# Patient Record
Sex: MALE | Race: WHITE | NOT HISPANIC OR LATINO | URBAN - METROPOLITAN AREA
[De-identification: names, ages, dates, MRNs, and addresses within clinical notes are randomized per-mention and may not be internally consistent; named-entity substitution may affect disease eponyms.]

---

## 2020-07-02 ENCOUNTER — AMBULATORY - HEALTHEAST (OUTPATIENT)
Dept: GERIATRICS | Facility: CLINIC | Age: 75
End: 2020-07-02

## 2020-07-03 ENCOUNTER — AMBULATORY - HEALTHEAST (OUTPATIENT)
Dept: GERIATRICS | Facility: CLINIC | Age: 75
End: 2020-07-03

## 2020-07-03 ENCOUNTER — COMMUNICATION - HEALTHEAST (OUTPATIENT)
Dept: GERIATRICS | Facility: CLINIC | Age: 75
End: 2020-07-03

## 2020-07-03 ENCOUNTER — OFFICE VISIT - HEALTHEAST (OUTPATIENT)
Dept: GERIATRICS | Facility: CLINIC | Age: 75
End: 2020-07-03

## 2020-07-03 ENCOUNTER — AMBULATORY - HEALTHEAST (OUTPATIENT)
Dept: ADMINISTRATIVE | Facility: CLINIC | Age: 75
End: 2020-07-03

## 2020-07-03 DIAGNOSIS — R10.30 LOWER ABDOMINAL PAIN: ICD-10-CM

## 2020-07-03 DIAGNOSIS — K22.2 ESOPHAGEAL OBSTRUCTION: ICD-10-CM

## 2020-07-03 DIAGNOSIS — R52 PAIN: ICD-10-CM

## 2020-07-03 DIAGNOSIS — Z71.89 ACP (ADVANCE CARE PLANNING): ICD-10-CM

## 2020-07-03 RX ORDER — PANTOPRAZOLE SODIUM 40 MG/1
40 TABLET, DELAYED RELEASE ORAL DAILY
Status: SHIPPED | COMMUNITY
Start: 2020-07-03

## 2020-07-03 RX ORDER — ATORVASTATIN CALCIUM 40 MG/1
40 TABLET, FILM COATED ORAL AT BEDTIME
Status: SHIPPED | COMMUNITY
Start: 2020-07-03

## 2020-07-03 RX ORDER — METRONIDAZOLE 500 MG/1
500 TABLET ORAL 3 TIMES DAILY
Status: SHIPPED | COMMUNITY
Start: 2020-07-03

## 2020-07-03 RX ORDER — ESOMEPRAZOLE MAGNESIUM 40 MG/1
40 CAPSULE, DELAYED RELEASE ORAL
Status: SHIPPED | COMMUNITY
Start: 2020-07-03

## 2020-07-03 RX ORDER — CALCIUM CARBONATE 500 MG/1
1 TABLET, CHEWABLE ORAL DAILY
Status: SHIPPED | COMMUNITY
Start: 2020-07-03

## 2020-07-03 RX ORDER — ACETAMINOPHEN 500 MG
1000 TABLET ORAL 3 TIMES DAILY
Status: SHIPPED | COMMUNITY
Start: 2020-07-03

## 2020-07-03 RX ORDER — FAMOTIDINE 40 MG/1
40 TABLET, FILM COATED ORAL DAILY
Status: SHIPPED | COMMUNITY
Start: 2020-07-03

## 2020-07-03 RX ORDER — IBUPROFEN 400 MG/1
400 TABLET, FILM COATED ORAL EVERY 6 HOURS PRN
Status: SHIPPED | COMMUNITY
Start: 2020-07-03

## 2020-07-03 RX ORDER — CASPOFUNGIN ACETATE 5 MG/ML
50 INJECTION, POWDER, LYOPHILIZED, FOR SOLUTION INTRAVENOUS AT BEDTIME
Status: SHIPPED | COMMUNITY
Start: 2020-07-03

## 2020-07-03 RX ORDER — AMOXICILLIN 250 MG
1 CAPSULE ORAL DAILY
Status: SHIPPED | COMMUNITY
Start: 2020-07-03

## 2020-07-03 RX ORDER — POLYETHYLENE GLYCOL 3350 17 G/17G
17 POWDER, FOR SOLUTION ORAL DAILY
Status: SHIPPED | COMMUNITY
Start: 2020-07-03

## 2020-07-03 RX ORDER — HYDROXYZINE PAMOATE 25 MG/1
25 CAPSULE ORAL 3 TIMES DAILY
Status: SHIPPED | COMMUNITY
Start: 2020-07-03

## 2020-07-03 RX ORDER — OXYCODONE HYDROCHLORIDE 5 MG/1
5 TABLET ORAL EVERY 4 HOURS PRN
Qty: 60 TABLET | Refills: 0 | Status: SHIPPED | OUTPATIENT
Start: 2020-07-03

## 2020-07-05 ENCOUNTER — RECORDS - HEALTHEAST (OUTPATIENT)
Dept: LAB | Facility: CLINIC | Age: 75
End: 2020-07-05

## 2020-07-06 ENCOUNTER — COMMUNICATION - HEALTHEAST (OUTPATIENT)
Dept: GERIATRICS | Facility: CLINIC | Age: 75
End: 2020-07-06

## 2020-07-06 LAB
ALT SERPL W P-5'-P-CCNC: 17 U/L (ref 0–45)
ANION GAP SERPL CALCULATED.3IONS-SCNC: 11 MMOL/L (ref 5–18)
BASOPHILS # BLD AUTO: 0 THOU/UL (ref 0–0.2)
BASOPHILS NFR BLD AUTO: 1 % (ref 0–2)
BUN SERPL-MCNC: 6 MG/DL (ref 8–28)
CALCIUM SERPL-MCNC: 8.6 MG/DL (ref 8.5–10.5)
CHLORIDE BLD-SCNC: 103 MMOL/L (ref 98–107)
CO2 SERPL-SCNC: 23 MMOL/L (ref 22–31)
CREAT SERPL-MCNC: 0.76 MG/DL (ref 0.7–1.3)
EOSINOPHIL # BLD AUTO: 0.1 THOU/UL (ref 0–0.4)
EOSINOPHIL NFR BLD AUTO: 1 % (ref 0–6)
ERYTHROCYTE [DISTWIDTH] IN BLOOD BY AUTOMATED COUNT: 15.8 % (ref 11–14.5)
GFR SERPL CREATININE-BSD FRML MDRD: >60 ML/MIN/1.73M2
GLUCOSE BLD-MCNC: 115 MG/DL (ref 70–125)
HCT VFR BLD AUTO: 40.6 % (ref 40–54)
HGB BLD-MCNC: 12.8 G/DL (ref 14–18)
LYMPHOCYTES # BLD AUTO: 0.6 THOU/UL (ref 0.8–4.4)
LYMPHOCYTES NFR BLD AUTO: 7 % (ref 20–40)
MCH RBC QN AUTO: 31 PG (ref 27–34)
MCHC RBC AUTO-ENTMCNC: 31.5 G/DL (ref 32–36)
MCV RBC AUTO: 98 FL (ref 80–100)
MONOCYTES # BLD AUTO: 0.6 THOU/UL (ref 0–0.9)
MONOCYTES NFR BLD AUTO: 8 % (ref 2–10)
NEUTROPHILS # BLD AUTO: 6.4 THOU/UL (ref 2–7.7)
NEUTROPHILS NFR BLD AUTO: 82 % (ref 50–70)
PLATELET # BLD AUTO: 362 THOU/UL (ref 140–440)
PMV BLD AUTO: 9.1 FL (ref 8.5–12.5)
POTASSIUM BLD-SCNC: 3.6 MMOL/L (ref 3.5–5)
RBC # BLD AUTO: 4.13 MILL/UL (ref 4.4–6.2)
SODIUM SERPL-SCNC: 137 MMOL/L (ref 136–145)
WBC: 7.8 THOU/UL (ref 4–11)

## 2020-07-07 ENCOUNTER — OFFICE VISIT - HEALTHEAST (OUTPATIENT)
Dept: GERIATRICS | Facility: CLINIC | Age: 75
End: 2020-07-07

## 2020-07-07 DIAGNOSIS — K22.2 ESOPHAGEAL OBSTRUCTION: ICD-10-CM

## 2020-07-07 DIAGNOSIS — C15.9 MALIGNANT NEOPLASM OF ESOPHAGUS, UNSPECIFIED LOCATION (H): ICD-10-CM

## 2020-07-07 DIAGNOSIS — R10.30 LOWER ABDOMINAL PAIN: ICD-10-CM

## 2020-07-09 ENCOUNTER — OFFICE VISIT - HEALTHEAST (OUTPATIENT)
Dept: GERIATRICS | Facility: CLINIC | Age: 75
End: 2020-07-09

## 2020-07-09 DIAGNOSIS — C15.9 MALIGNANT NEOPLASM OF ESOPHAGUS, UNSPECIFIED LOCATION (H): ICD-10-CM

## 2020-07-09 DIAGNOSIS — R13.14 PHARYNGOESOPHAGEAL DYSPHAGIA: ICD-10-CM

## 2020-07-09 DIAGNOSIS — K22.2 ESOPHAGEAL OBSTRUCTION: ICD-10-CM

## 2020-07-09 DIAGNOSIS — E44.0 MODERATE PROTEIN-CALORIE MALNUTRITION (H): ICD-10-CM

## 2020-07-09 DIAGNOSIS — R10.30 LOWER ABDOMINAL PAIN: ICD-10-CM

## 2020-07-12 ENCOUNTER — RECORDS - HEALTHEAST (OUTPATIENT)
Dept: LAB | Facility: CLINIC | Age: 75
End: 2020-07-12

## 2020-07-13 ENCOUNTER — COMMUNICATION - HEALTHEAST (OUTPATIENT)
Dept: GERIATRICS | Facility: CLINIC | Age: 75
End: 2020-07-13

## 2020-07-13 LAB
ALT SERPL W P-5'-P-CCNC: 11 U/L (ref 0–45)
BASOPHILS # BLD AUTO: 0.1 THOU/UL (ref 0–0.2)
BASOPHILS NFR BLD AUTO: 1 % (ref 0–2)
CREAT SERPL-MCNC: 0.7 MG/DL (ref 0.7–1.3)
EOSINOPHIL # BLD AUTO: 0.3 THOU/UL (ref 0–0.4)
EOSINOPHIL NFR BLD AUTO: 5 % (ref 0–6)
ERYTHROCYTE [DISTWIDTH] IN BLOOD BY AUTOMATED COUNT: 16.1 % (ref 11–14.5)
GFR SERPL CREATININE-BSD FRML MDRD: >60 ML/MIN/1.73M2
HCT VFR BLD AUTO: 40.2 % (ref 40–54)
HGB BLD-MCNC: 12.3 G/DL (ref 14–18)
LYMPHOCYTES # BLD AUTO: 1 THOU/UL (ref 0.8–4.4)
LYMPHOCYTES NFR BLD AUTO: 15 % (ref 20–40)
MCH RBC QN AUTO: 30.9 PG (ref 27–34)
MCHC RBC AUTO-ENTMCNC: 30.6 G/DL (ref 32–36)
MCV RBC AUTO: 101 FL (ref 80–100)
MONOCYTES # BLD AUTO: 0.7 THOU/UL (ref 0–0.9)
MONOCYTES NFR BLD AUTO: 11 % (ref 2–10)
NEUTROPHILS # BLD AUTO: 4.3 THOU/UL (ref 2–7.7)
NEUTROPHILS NFR BLD AUTO: 68 % (ref 50–70)
PLATELET # BLD AUTO: 341 THOU/UL (ref 140–440)
PMV BLD AUTO: 10 FL (ref 8.5–12.5)
RBC # BLD AUTO: 3.98 MILL/UL (ref 4.4–6.2)
WBC: 6.4 THOU/UL (ref 4–11)

## 2020-07-14 ENCOUNTER — OFFICE VISIT - HEALTHEAST (OUTPATIENT)
Dept: GERIATRICS | Facility: CLINIC | Age: 75
End: 2020-07-14

## 2020-07-14 DIAGNOSIS — K22.2 ESOPHAGEAL OBSTRUCTION: ICD-10-CM

## 2020-07-14 DIAGNOSIS — R10.30 LOWER ABDOMINAL PAIN: ICD-10-CM

## 2020-07-14 DIAGNOSIS — E44.0 MODERATE PROTEIN-CALORIE MALNUTRITION (H): ICD-10-CM

## 2020-07-14 DIAGNOSIS — C15.9 MALIGNANT NEOPLASM OF ESOPHAGUS, UNSPECIFIED LOCATION (H): ICD-10-CM

## 2020-07-16 ENCOUNTER — OFFICE VISIT - HEALTHEAST (OUTPATIENT)
Dept: GERIATRICS | Facility: CLINIC | Age: 75
End: 2020-07-16

## 2020-07-16 DIAGNOSIS — R13.14 PHARYNGOESOPHAGEAL DYSPHAGIA: ICD-10-CM

## 2020-07-16 DIAGNOSIS — E44.0 MODERATE PROTEIN-CALORIE MALNUTRITION (H): ICD-10-CM

## 2020-07-16 DIAGNOSIS — C15.9 MALIGNANT NEOPLASM OF ESOPHAGUS, UNSPECIFIED LOCATION (H): ICD-10-CM

## 2020-07-16 DIAGNOSIS — R10.30 LOWER ABDOMINAL PAIN: ICD-10-CM

## 2020-07-16 DIAGNOSIS — K22.2 ESOPHAGEAL OBSTRUCTION: ICD-10-CM

## 2020-07-19 ENCOUNTER — RECORDS - HEALTHEAST (OUTPATIENT)
Dept: LAB | Facility: CLINIC | Age: 75
End: 2020-07-19

## 2020-07-20 LAB
ALT SERPL W P-5'-P-CCNC: 39 U/L (ref 0–45)
ANION GAP SERPL CALCULATED.3IONS-SCNC: 10 MMOL/L (ref 5–18)
BASOPHILS # BLD AUTO: 0.1 THOU/UL (ref 0–0.2)
BASOPHILS NFR BLD AUTO: 1 % (ref 0–2)
BUN SERPL-MCNC: 13 MG/DL (ref 8–28)
CALCIUM SERPL-MCNC: 10.6 MG/DL (ref 8.5–10.5)
CHLORIDE BLD-SCNC: 101 MMOL/L (ref 98–107)
CO2 SERPL-SCNC: 24 MMOL/L (ref 22–31)
CREAT SERPL-MCNC: 0.75 MG/DL (ref 0.7–1.3)
EOSINOPHIL # BLD AUTO: 0.2 THOU/UL (ref 0–0.4)
EOSINOPHIL NFR BLD AUTO: 3 % (ref 0–6)
ERYTHROCYTE [DISTWIDTH] IN BLOOD BY AUTOMATED COUNT: 15.7 % (ref 11–14.5)
GFR SERPL CREATININE-BSD FRML MDRD: >60 ML/MIN/1.73M2
GLUCOSE BLD-MCNC: 102 MG/DL (ref 70–125)
HCT VFR BLD AUTO: 43.4 % (ref 40–54)
HGB BLD-MCNC: 13.7 G/DL (ref 14–18)
LYMPHOCYTES # BLD AUTO: 0.8 THOU/UL (ref 0.8–4.4)
LYMPHOCYTES NFR BLD AUTO: 10 % (ref 20–40)
MCH RBC QN AUTO: 31.1 PG (ref 27–34)
MCHC RBC AUTO-ENTMCNC: 31.6 G/DL (ref 32–36)
MCV RBC AUTO: 98 FL (ref 80–100)
MONOCYTES # BLD AUTO: 0.7 THOU/UL (ref 0–0.9)
MONOCYTES NFR BLD AUTO: 9 % (ref 2–10)
NEUTROPHILS # BLD AUTO: 5.7 THOU/UL (ref 2–7.7)
NEUTROPHILS NFR BLD AUTO: 76 % (ref 50–70)
PLATELET # BLD AUTO: 353 THOU/UL (ref 140–440)
PMV BLD AUTO: 9.4 FL (ref 8.5–12.5)
POTASSIUM BLD-SCNC: 4.2 MMOL/L (ref 3.5–5)
RBC # BLD AUTO: 4.41 MILL/UL (ref 4.4–6.2)
SODIUM SERPL-SCNC: 135 MMOL/L (ref 136–145)
WBC: 7.4 THOU/UL (ref 4–11)

## 2020-07-21 ENCOUNTER — OFFICE VISIT - HEALTHEAST (OUTPATIENT)
Dept: GERIATRICS | Facility: CLINIC | Age: 75
End: 2020-07-21

## 2020-07-21 DIAGNOSIS — C15.9 MALIGNANT NEOPLASM OF ESOPHAGUS, UNSPECIFIED LOCATION (H): ICD-10-CM

## 2020-07-21 DIAGNOSIS — R10.30 LOWER ABDOMINAL PAIN: ICD-10-CM

## 2020-07-21 DIAGNOSIS — K22.2 ESOPHAGEAL OBSTRUCTION: ICD-10-CM

## 2020-07-21 DIAGNOSIS — E44.0 MODERATE PROTEIN-CALORIE MALNUTRITION (H): ICD-10-CM

## 2020-07-22 ENCOUNTER — OFFICE VISIT - HEALTHEAST (OUTPATIENT)
Dept: GERIATRICS | Facility: CLINIC | Age: 75
End: 2020-07-22

## 2020-07-22 DIAGNOSIS — R10.30 LOWER ABDOMINAL PAIN: ICD-10-CM

## 2020-07-22 DIAGNOSIS — R63.8 DECREASED ORAL INTAKE: ICD-10-CM

## 2020-07-22 LAB
ALP BONE SERPL-CCNC: 21 U/L (ref 0–55)
ALP LIVER SERPL-CCNC: 111 U/L (ref 0–94)
ALP OTHER SERPL-CCNC: 0 U/L
ALP SERPL-CCNC: 132 U/L (ref 40–120)

## 2020-07-24 ENCOUNTER — AMBULATORY - HEALTHEAST (OUTPATIENT)
Dept: GERIATRICS | Facility: CLINIC | Age: 75
End: 2020-07-24

## 2021-06-04 VITALS
HEART RATE: 58 BPM | DIASTOLIC BLOOD PRESSURE: 75 MMHG | WEIGHT: 178 LBS | TEMPERATURE: 98.4 F | SYSTOLIC BLOOD PRESSURE: 108 MMHG

## 2021-06-04 VITALS
TEMPERATURE: 97.1 F | HEART RATE: 48 BPM | DIASTOLIC BLOOD PRESSURE: 51 MMHG | SYSTOLIC BLOOD PRESSURE: 96 MMHG | WEIGHT: 161 LBS

## 2021-06-09 NOTE — PROGRESS NOTES
"[facility]   Video Visit    Code Status: FULL CODE    Elias Elam is a 74 y.o. male who is being evaluated via a billable video visit.      The patient has been notified of following:     \"This video visit will be conducted via a call between you and your physician/provider. We have found that certain health care needs can be provided without the need for an in-person physical exam.  This service lets us provide the care you need with a video conversation.  If a prescription is necessary we can send it to the facility team.  If lab work is needed we can place an order through the facility team to have that test done at a later time.    If during the course of the call the physician/provider feels a video visit is not appropriate, you will not be charged for this service.\"     Physician/Provider has received verbal consent for a Video Visit from the patient? Yes    Patient would like the video invitation sent by: Send to: [employer]    Video Start Time: 9.52 am    Chief Complaint/Reason for Visit:  Chief Complaint   Patient presents with     Review Of Multiple Medical Conditions   Follow-up on CT scan and pain management    HPI:   Elias is a 74 y.o. male who is admitted to the TCU as a transfer from the hospital.  Patient has a complex medical history including a past medical history of esophageal cancer  He initially was diagnosed last year and underwent surgical repair.  Since then he believes he is cancer free.    Unfortunately this left him with profound dysphagia and difficulty in swallowing.  He had a GJ tube removal done.  Unfortunately due to persistent dysphagia currently he has a dilator which he is using himself to open up his esophagus.  He is drinking boost and refusing any solid diet as he does not tolerate he also has concerns about medications getting stuck and has to be very careful with what he eats and drinks but he is maintaining a stable weight as per him    Pain " management reviewed with him he has not asked for a single narcotics since the 14th.  He feels his pain is very well controlled just with Tylenol as well as Vistaril  Suspect that as his infection is resolving his pain is going down quite nicely clinically also he feels better.  He is ambulating without any assist device    He also has repeat imaging scheduled in the [facility] in 2 weeks he is aware of that this is to ensure that his fluid collections are getting smaller.  He has a drain in his left upper quadrant which is producing some fluid every day as per him.  In the meantime he is hoping that he can discharge home we did talk about his antibiotics and he knows they may not be covered by insurance      I have reviewed and updated the patient's Past Medical History, includes a history of esophageal cancer.  Chronic pain and removal of jejunostomy tube    Social History, includes history of prior smoking no current history of alcohol or smoking.     Family History includes h/o of renal failure in his brother who is on HD ; sister has breast cancer   and Medication List.    ALLERGIES  Metoprolol and Nitroglycerin    Review of Systems  Constitutional: Negative.  Negative for fever, chills, he has activity change, appetite change and fatigue.   He is refusing medications  He is refusing any kind of oral intake and drinking only 5 cans of boost in a day  Also reporting persistent dysphagia and difficulty with swallowing   He ended up self dilating his throat  HE DOES NOT EAT SOLID FOODS AT ALL  HENT: Negative for congestion and facial swelling.    Eyes: Negative for photophobia, redness and visual disturbance.   Respiratory: Negative for cough and chest tightness.    Cardiovascular: Negative for chest pain, palpitations and leg swelling.   Gastrointestinal: Negative for nausea, diarrhea, constipation, blood in stool and abdominal distention.   Has a CASANDRA drain which is intact and draining  Genitourinary: Negative.     Musculoskeletal: Negative.  Reporting weakness in a bandlike pain around his abdomen has pretty much improved and now he is ambulating and has no pain  Skin: Negative.    Neurological: Negative for dizziness, tremors, syncope, weakness, light-headedness and headaches.   Hematological: Does not bruise/bleed easily.   Psychiatric/Behavioral: Patient has had a significant improvement in mood    Physical exam  Wt 178 lb  / 55 T98 P80  GENERAL: no acute distress. Cooperative in conversation. FRAIL  HEENT: pupils are equal, round and reactive. Oral mucosa is moist and intact.  RESP:Chest symmetric. Regular respiratory rate. No stridor.  ABD: Nondistended, soft.  HAS A CASANDRA DRAIN IN LUQ  EXTREMITIES: No lower extremity edema.   NEURO: non focal. Alert and oriented x3.   PSYCH: within normal limits. No depression ; HAS  anxiety.  SKIN: warm dry intact         Medication List:  Current Outpatient Medications   Medication Sig     acetaminophen (TYLENOL) 500 MG tablet Take 1,000 mg by mouth 3 (three) times a day.      atorvastatin (LIPITOR) 40 MG tablet Take 40 mg by mouth at bedtime.     calcium, as carbonate, (TUMS) 200 mg calcium (500 mg) chewable tablet Chew 1 tablet daily.     caspofungin (CANCIDAS) 50 mg injection Infuse 50 mg into a venous catheter at bedtime.     ertapenem sodium (ERTAPENEM INJ) Inject 10 mL as directed once.     esomeprazole (NEXIUM) 40 MG capsule Take 40 mg by mouth daily before breakfast.     famotidine (PEPCID) 40 MG tablet Take 40 mg by mouth daily.     hydrOXYzine pamoate (VISTARIL) 25 MG capsule Take 25 mg by mouth 3 (three) times a day.      ibuprofen (ADVIL,MOTRIN) 400 MG tablet Take 400 mg by mouth every 6 (six) hours as needed for pain.     metoprolol tartrate (LOPRESSOR) 6.25 MG/5 ML Susp Take 12.5 mg by mouth 2 (two) times a day.     metroNIDAZOLE (FLAGYL) 500 MG tablet Take 500 mg by mouth 3 (three) times a day.     oxyCODONE (ROXICODONE) 5 MG immediate release tablet Take 1 tablet  (5 mg total) by mouth every 4 (four) hours as needed for pain.     pantoprazole (PROTONIX) 40 MG tablet Take 40 mg by mouth daily.     polyethylene glycol (MIRALAX) 17 gram packet Take 17 g by mouth daily.     senna-docusate (PERICOLACE) 8.6-50 mg tablet Take 1 tablet by mouth daily.       Labs:  Recent Results (from the past 240 hour(s))   ALT (SGPT)   Result Value Ref Range    ALT 11 0 - 45 U/L   Creatinine   Result Value Ref Range    Creatinine 0.70 0.70 - 1.30 mg/dL    GFR MDRD Af Amer >60 >60 mL/min/1.73m2    GFR MDRD Non Af Amer >60 >60 mL/min/1.73m2   HM1 (CBC with Diff)   Result Value Ref Range    WBC 6.4 4.0 - 11.0 thou/uL    RBC 3.98 (L) 4.40 - 6.20 mill/uL    Hemoglobin 12.3 (L) 14.0 - 18.0 g/dL    Hematocrit 40.2 40.0 - 54.0 %     (H) 80 - 100 fL    MCH 30.9 27.0 - 34.0 pg    MCHC 30.6 (L) 32.0 - 36.0 g/dL    RDW 16.1 (H) 11.0 - 14.5 %    Platelets 341 140 - 440 thou/uL    MPV 10.0 8.5 - 12.5 fL    Neutrophils % 68 50 - 70 %    Lymphocytes % 15 (L) 20 - 40 %    Monocytes % 11 (H) 2 - 10 %    Eosinophils % 5 0 - 6 %    Basophils % 1 0 - 2 %    Neutrophils Absolute 4.3 2.0 - 7.7 thou/uL    Lymphocytes Absolute 1.0 0.8 - 4.4 thou/uL    Monocytes Absolute 0.7 0.0 - 0.9 thou/uL    Eosinophils Absolute 0.3 0.0 - 0.4 thou/uL    Basophils Absolute 0.1 0.0 - 0.2 thou/uL   ct shows decreased size of fluid collection    Assessment/Plan:  -Multiple intra-abdominal polymicrobial abscesses status post drain placement 6/24/2020 with drain in primary left upper quadrant collection.  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  -Long-term antibiotic use toxicity monitoring  -Acute episode of dysphagia reported with patient having difficulty with swallowing of ibuprofen  -Acute peritonitis with CT showing pneumoperitoneum and abscess  -Status post CT-guided drain placement of the left upper quadrant fluid collection on  6/24/2020  -Status post sinogram on 6/26/2020 demonstrating a fistulous connection with the adjacent descending colon  - History of a jejunostomy tube takedown  -Underlying history of esophageal cancer  -Prior history of a minimally invasive esophagectomy on 8/30/2019  -Prior history of persistent leakage from the jejunostomy tube status post repair on 6/2/2020  -Dysphagia patient not eating and drinking other than boost  -Pain management patient reporting severe pain  -Moderate protein calorie malnutrition    Patient has been admitted to the TCU  He is quite happy and pleased with his outcomes and his mood has improved significantly.  CT imaging did reveal that his fistula size has decreased.  He plans to be compliant with his follow-up imaging in 2 weeks.  He is now on a different round of antibiotics and remains afebrile with significant improvement overall in his pain  He is hardly using narcotics for the last 48 hours.  Mood has improved now that his pain has gone down with less anxiety reported.  Discharge planning reviewed he is hoping that his insurance will cover his antibiotics and he can manage at home.  I told him he has a chronic issue and would benefit from seeing [facility] closely and getting done with his antibiotics and staying compliant he may still be looking at surgery.  He is ambulating independently        Video-Visit Details    Type of service:  Video Visit    Video End Time (time video stopped): 10  .05 AM    Originating Location (pt. Location):[facility] [[id number]]    Distant Location (provider location):  [facility]     Mode of Communication:  Zoom Video Conference        MESHA Shaw

## 2021-06-09 NOTE — TELEPHONE ENCOUNTER
[facility] Nurse Triage Telephone Note      Provider: GIANCARLO Watkins  Facility: [facility]    Facility Type: TCU    Caller: Manuela  Call Back Number:  [phone]    Allergies: Metoprolol and Nitroglycerin    Reason for call: Nurse calling to report Heme 1, BMP, and ALT results.       Verbal Order/Direction given by Provider: Fax labs to patient's ID MD at [facility].      Provider giving order: GIANCARLO Watkins    Verbal order given to: Manuela Garcia RN

## 2021-06-09 NOTE — PROGRESS NOTES
[facility] Admission note      Patient: Elias Elam  MRN: [id number]  Date of Service: 7/14/2020      [facility] [[id number]]  Reason for Visit     Chief Complaint   Patient presents with     Review Of Multiple Medical Conditions       Code Status     FULL CODE    Assessment     - multiple intra abdominal polymicrobial abscess  S/p drain placement 6/24 /20  - dysphagia pt doing self dilatation  -That is post CT-guided drain placement in the left upper quadrant fluid collection on 6/24/2020  -History of esophageal cancer  -Pain management patient reporting significant improvement    Plan     Patient was evaluated and his repeat CT scan imaging was reviewed with him that he had in the [facility].  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  He is feeling better.  He is no longer febrile or having any significant pain to the point that he has cut down his narcotic intake significantly.  He will follow-up with [facility] in the meantime due to dysphagia he is doing his self dilatation.  He has no desire to eat and will stick to his boost supplementation.  Weights are stable at 166 pounds.  Pain has improved significantly and now he is reporting that he is using narcotics occasionally as as needed compared to earlier when he was having severe pain  He is now hopeful that he may discharge home he understands his antibiotics may not be covered and will talk to his insurance    History     Patient is a very pleasant 74 y.o. male who is admitted to TCU  He has an underlying history of esophageal cancer and currently he is believed that he is cured of the disease.  He does have significant dysphagia secondary to his prior  surgeries and has been self dilating his throat with the dilator he did show to me and he is quite comfortable doing it on his own.  Due to dysphagia he remains on a soft diet he generally takes boost  And is on liquids but does not eat anything  Weights are currently stable at 166 pounds.  He currently has a CASANDRA drain present in his left upper quadrant which has about 10 to 15 mL of fluid.  Pain management was reviewed with him earlier he was complaining of severe pain now he is reporting that his pain is down he is taking a narcotic only occasionally.  Physically he is ambulating without any assist device and feels he may be ready to go home soon        Past Medical History     Active Ambulatory (Non-Hospital) Problems    Diagnosis     ACP (advance care planning)     Encounter for attention to other artificial openings of digestive tract (H)     Esophageal obstruction     Abdominal pain     Past Medical History:   Diagnosis Date     Anemia      Atherosclerosis of coronary artery of native heart without angina pectoris      Cataract      Coronary artery disease      Depressive disorder      Dysphagia      Gastroesophageal reflux disease      Malignant neoplasm (H)      Malignant neoplasm of esophagus (H)      Malignant neoplasm of upper third esophagus (H)      Methicillin resistant Staphylococcus aureus infection      Urinary retention        Past Social History     Reviewed, and he  reports that he has quit smoking. His smoking use included cigarettes. He smoked 1.50 packs per day. He has never used smokeless tobacco. He reports previous alcohol use of about 2.0 standard drinks of alcohol per week. He reports that he does not use drugs.    Family History     Reviewed, and family history includes Arthritis in his mother; Breast cancer in his sister; Coronary artery disease in his sister; Dementia in his paternal grandfather; Obesity in his son; Sleep apnea in his son.    Medication List   Post Discharge Medication  Reconciliation Status: discharge medications reconciled, continue medications without change   Current Outpatient Medications on File Prior to Visit   Medication Sig Dispense Refill     acetaminophen (TYLENOL) 500 MG tablet Take 1,000 mg by mouth 3 (three) times a day.        atorvastatin (LIPITOR) 40 MG tablet Take 40 mg by mouth at bedtime.       calcium, as carbonate, (TUMS) 200 mg calcium (500 mg) chewable tablet Chew 1 tablet daily.       caspofungin (CANCIDAS) 50 mg injection Infuse 50 mg into a venous catheter at bedtime.       ertapenem sodium (ERTAPENEM INJ) Inject 10 mL as directed once.       esomeprazole (NEXIUM) 40 MG capsule Take 40 mg by mouth daily before breakfast.       famotidine (PEPCID) 40 MG tablet Take 40 mg by mouth daily.       hydrOXYzine pamoate (VISTARIL) 25 MG capsule Take 25 mg by mouth 3 (three) times a day.        ibuprofen (ADVIL,MOTRIN) 400 MG tablet Take 400 mg by mouth every 6 (six) hours as needed for pain.       metoprolol tartrate (LOPRESSOR) 6.25 MG/5 ML Susp Take 12.5 mg by mouth 2 (two) times a day.       metroNIDAZOLE (FLAGYL) 500 MG tablet Take 500 mg by mouth 3 (three) times a day.       oxyCODONE (ROXICODONE) 5 MG immediate release tablet Take 1 tablet (5 mg total) by mouth every 4 (four) hours as needed for pain. 60 tablet 0     pantoprazole (PROTONIX) 40 MG tablet Take 40 mg by mouth daily.       polyethylene glycol (MIRALAX) 17 gram packet Take 17 g by mouth daily.       senna-docusate (PERICOLACE) 8.6-50 mg tablet Take 1 tablet by mouth daily.       No current facility-administered medications on file prior to visit.        Allergies     Allergies   Allergen Reactions     Metoprolol      Nitroglycerin        Review of Systems   A comprehensive review of 14 systems was done. Pertinent findings noted here and in history of present illness. All the rest negative.  Constitutional: Negative.  Negative for fever, chills, HAS activity change, appetite change and fatigue.    HENT: Negative for congestion and facial swelling.    Eyes: Negative for photophobia, redness and visual disturbance.   Respiratory: Negative for cough and chest tightness.    Cardiovascular: Negative for chest pain, palpitations and leg swelling.   Gastrointestinal: Negative for nausea, diarrhea, constipation, blood in stool and abdominal distention.   REPORTING LESS ABD PAIN  Genitourinary: Negative.    Musculoskeletal: Negative.  WALKING WELL  Skin: Negative.    Neurological: Negative for dizziness, tremors, syncope, weakness, light-headedness and headaches.   Hematological: Does not bruise/bleed easily.   Psychiatric/Behavioral: Negative.        Physical Exam     Recent Vitals 7/4/2020   Weight 178 lbs   /75   Pulse 58   Temp 98.4       Constitutional: Oriented to person, place, and time and appears well-developed.   HEENT:  Normocephalic and atraumatic.  Eyes: Conjunctivae and EOM are normal. Pupils are equal, round, and reactive to light. No discharge.  No scleral icterus. Nose normal. Mouth/Throat: Oropharynx is clear and moist. No oropharyngeal exudate.    NECK: Normal range of motion. Neck supple. No JVD present. No tracheal deviation present. No thyromegaly present.   CARDIOVASCULAR: Normal rate, regular rhythm and intact distal pulses.  Exam reveals no gallop and no friction rub.  Systolic murmur present.  PULMONARY: Effort normal and breath sounds normal. No respiratory distress.No Wheezing or rales.  ABDOMEN: Soft. Bowel sounds are normal. No distension and no mass.  There is no tenderness. There is no rebound and no guarding. No HSM.  CASANDRA DRAIN PATENT IN LUQ  MUSCULOSKELETAL: Normal range of motion. No edema and no tenderness. Mild kyphosis, no tenderness.  LYMPH NODES: Has no cervical, supraclavicular, axillary and groin adenopathy.   NEUROLOGICAL: Alert and oriented to person, place, and time. No cranial nerve deficit.  Normal muscle tone. Coordination normal.   GENITOURINARY: Deferred  exam.  SKIN: Skin is warm and dry. No rash noted. No erythema. No pallor.   EXTREMITIES: No cyanosis, no clubbing, no edema. No Deformity.  PSYCHIATRIC: Normal mood, affect and behavior.      Lab Results     Recent Results (from the past 240 hour(s))   Basic Metabolic Panel    Collection Time: 07/06/20  9:34 AM   Result Value Ref Range    Sodium 137 136 - 145 mmol/L    Potassium 3.6 3.5 - 5.0 mmol/L    Chloride 103 98 - 107 mmol/L    CO2 23 22 - 31 mmol/L    Anion Gap, Calculation 11 5 - 18 mmol/L    Glucose 115 70 - 125 mg/dL    Calcium 8.6 8.5 - 10.5 mg/dL    BUN 6 (L) 8 - 28 mg/dL    Creatinine 0.76 0.70 - 1.30 mg/dL    GFR MDRD Af Amer >60 >60 mL/min/1.73m2    GFR MDRD Non Af Amer >60 >60 mL/min/1.73m2   ALT (SGPT)    Collection Time: 07/06/20  9:34 AM   Result Value Ref Range    ALT 17 0 - 45 U/L   HM1 (CBC with Diff)    Collection Time: 07/06/20  9:34 AM   Result Value Ref Range    WBC 7.8 4.0 - 11.0 thou/uL    RBC 4.13 (L) 4.40 - 6.20 mill/uL    Hemoglobin 12.8 (L) 14.0 - 18.0 g/dL    Hematocrit 40.6 40.0 - 54.0 %    MCV 98 80 - 100 fL    MCH 31.0 27.0 - 34.0 pg    MCHC 31.5 (L) 32.0 - 36.0 g/dL    RDW 15.8 (H) 11.0 - 14.5 %    Platelets 362 140 - 440 thou/uL    MPV 9.1 8.5 - 12.5 fL    Neutrophils % 82 (H) 50 - 70 %    Lymphocytes % 7 (L) 20 - 40 %    Monocytes % 8 2 - 10 %    Eosinophils % 1 0 - 6 %    Basophils % 1 0 - 2 %    Neutrophils Absolute 6.4 2.0 - 7.7 thou/uL    Lymphocytes Absolute 0.6 (L) 0.8 - 4.4 thou/uL    Monocytes Absolute 0.6 0.0 - 0.9 thou/uL    Eosinophils Absolute 0.1 0.0 - 0.4 thou/uL    Basophils Absolute 0.0 0.0 - 0.2 thou/uL   ALT (SGPT)    Collection Time: 07/13/20  7:52 AM   Result Value Ref Range    ALT 11 0 - 45 U/L   Creatinine    Collection Time: 07/13/20  7:52 AM   Result Value Ref Range    Creatinine 0.70 0.70 - 1.30 mg/dL    GFR MDRD Af Amer >60 >60 mL/min/1.73m2    GFR MDRD Non Af Amer >60 >60 mL/min/1.73m2   HM1 (CBC with Diff)    Collection Time: 07/13/20  7:52 AM    Result Value Ref Range    WBC 6.4 4.0 - 11.0 thou/uL    RBC 3.98 (L) 4.40 - 6.20 mill/uL    Hemoglobin 12.3 (L) 14.0 - 18.0 g/dL    Hematocrit 40.2 40.0 - 54.0 %     (H) 80 - 100 fL    MCH 30.9 27.0 - 34.0 pg    MCHC 30.6 (L) 32.0 - 36.0 g/dL    RDW 16.1 (H) 11.0 - 14.5 %    Platelets 341 140 - 440 thou/uL    MPV 10.0 8.5 - 12.5 fL    Neutrophils % 68 50 - 70 %    Lymphocytes % 15 (L) 20 - 40 %    Monocytes % 11 (H) 2 - 10 %    Eosinophils % 5 0 - 6 %    Basophils % 1 0 - 2 %    Neutrophils Absolute 4.3 2.0 - 7.7 thou/uL    Lymphocytes Absolute 1.0 0.8 - 4.4 thou/uL    Monocytes Absolute 0.7 0.0 - 0.9 thou/uL    Eosinophils Absolute 0.3 0.0 - 0.4 thou/uL    Basophils Absolute 0.1 0.0 - 0.2 thou/uL            Imaging Results     No results found.          MESHA Shaw

## 2021-06-09 NOTE — PROGRESS NOTES
"[facility]   Video Visit    Code Status: FULL CODE    Elias Elam is a 74 y.o. male who is being evaluated via a billable video visit.      The patient has been notified of following:     \"This video visit will be conducted via a call between you and your physician/provider. We have found that certain health care needs can be provided without the need for an in-person physical exam.  This service lets us provide the care you need with a video conversation.  If a prescription is necessary we can send it to the facility team.  If lab work is needed we can place an order through the facility team to have that test done at a later time.    If during the course of the call the physician/provider feels a video visit is not appropriate, you will not be charged for this service.\"     Physician/Provider has received verbal consent for a Video Visit from the patient? Yes    Patient would like the video invitation sent by: Send to: [employer]    Video Start Time: 12.15 am    Chief Complaint/Reason for Visit:  Chief Complaint   Patient presents with     Problem Visit   Follow-up on CT scan and pain management    HPI:   Elias is a 74 y.o. male who is admitted to the TCU as a transfer from the hospital.  Patient has a complex medical history including a past medical history of esophageal cancer  He initially was diagnosed last year and underwent surgical repair.  Since then he believes he is cancer free.    Unfortunately this left him with profound dysphagia and difficulty in swallowing.  He had a GJ tube removal done.  Unfortunately due to persistent dysphagia currently he has a dilator which he is using himself to open up his esophagus.    Seen urgently at the request of nursing because of intractable pain with nausea that he has been reporting.  Patient reports that he has been eating a little better and has been not only drinking his boost but he has been having some intake of food also now.  He is " reporting severe nausea post intake he is not sure if this is related to food medication or boost because he is reporting that any slight movement is causing him significant pain.  Unfortunately this is not a localized pain and he is unable to completely explain it he is not reporting any constipation so far has not had any significant bouts of emesis either.  He ended up going to the emergency room and he had urgent imaging which she wanted to review that actually did not show any obstruction and shows resolution of his abscess overall picture has been reportedly going well and he has been discharged back to the nursing home with Zofran.    Pain management reviewed with him he has not asked for a single narcotics since the 14th.  He feels his pain is very well controlled just with Tylenol as well as Vistaril  Suspect that as his infection is resolving his pain is going down quite nicely clinically also he feels better.  He is ambulating without any assist device    He had lab work done in the emergency room today and he had recent labs done in the TCU which were reviewed with him his hemoglobin is 13.7 and his white count is normal  His electrolytes are stable other than mild hyponatremia with a sodium 135 his calcium is slightly up at 10.6.  I am wondering if he is getting dehydrated and that may be contributing to his nausea      I have reviewed and updated the patient's Past Medical History, includes a history of esophageal cancer.  Chronic pain and removal of jejunostomy tube    Social History, includes history of prior smoking no current history of alcohol or smoking.     Family History includes h/o of renal failure in his brother who is on HD ; sister has breast cancer   and Medication List.    ALLERGIES  Metoprolol and Nitroglycerin    Review of Systems  Constitutional: Negative.  Negative for fever, chills, he has activity change, appetite change and fatigue.   He is refusing medications  He is refusing any  kind of oral intake and drinking only 5 cans of boost in a day  Also reporting persistent dysphagia and difficulty with swallowing   He ended up self dilating his throat  HE DOES NOT EAT SOLID FOODS AT ALL  HENT: Negative for congestion and facial swelling.    Eyes: Negative for photophobia, redness and visual disturbance.   Respiratory: Negative for cough and chest tightness.    Cardiovascular: Negative for chest pain, palpitations and leg swelling.   Gastrointestinal: Negative for nausea, diarrhea, constipation, blood in stool and abdominal distention.   Has a CASANDRA drain which is intact and draining  Genitourinary: Negative.    Musculoskeletal: Negative.  Reporting weakness in a bandlike pain around his abdomen has pretty much improved and now he is ambulating and has no pain  Skin: Negative.    Neurological: Negative for dizziness, tremors, syncope, weakness, light-headedness and headaches.   Hematological: Does not bruise/bleed easily.   Psychiatric/Behavioral: Patient has had a significant improvement in mood    Physical exam  Wt 178 lb  / 55 T98 P80  GENERAL: no acute distress. Cooperative in conversation. FRAIL  HEENT: pupils are equal, round and reactive. Oral mucosa is moist and intact.  RESP:Chest symmetric. Regular respiratory rate. No stridor.  ABD: Nondistended, soft.  HAS A CASANDRA DRAIN IN LUQ  EXTREMITIES: No lower extremity edema.   NEURO: non focal. Alert and oriented x3.   PSYCH: within normal limits. No depression ; HAS  anxiety.  SKIN: warm dry intact         Medication List:  Current Outpatient Medications   Medication Sig     acetaminophen (TYLENOL) 500 MG tablet Take 1,000 mg by mouth 3 (three) times a day.      atorvastatin (LIPITOR) 40 MG tablet Take 40 mg by mouth at bedtime.     calcium, as carbonate, (TUMS) 200 mg calcium (500 mg) chewable tablet Chew 1 tablet daily.     caspofungin (CANCIDAS) 50 mg injection Infuse 50 mg into a venous catheter at bedtime.     ertapenem sodium (ERTAPENEM  INJ) Inject 10 mL as directed once.     esomeprazole (NEXIUM) 40 MG capsule Take 40 mg by mouth daily before breakfast.     famotidine (PEPCID) 40 MG tablet Take 40 mg by mouth daily.     hydrOXYzine pamoate (VISTARIL) 25 MG capsule Take 25 mg by mouth 3 (three) times a day.      ibuprofen (ADVIL,MOTRIN) 400 MG tablet Take 400 mg by mouth every 6 (six) hours as needed for pain.     metoprolol tartrate (LOPRESSOR) 6.25 MG/5 ML Susp Take 12.5 mg by mouth 2 (two) times a day.     metroNIDAZOLE (FLAGYL) 500 MG tablet Take 500 mg by mouth 3 (three) times a day.     oxyCODONE (ROXICODONE) 5 MG immediate release tablet Take 1 tablet (5 mg total) by mouth every 4 (four) hours as needed for pain.     pantoprazole (PROTONIX) 40 MG tablet Take 40 mg by mouth daily.     polyethylene glycol (MIRALAX) 17 gram packet Take 17 g by mouth daily.     senna-docusate (PERICOLACE) 8.6-50 mg tablet Take 1 tablet by mouth daily.       Labs:  Recent Results (from the past 240 hour(s))   ALT (SGPT)   Result Value Ref Range    ALT 11 0 - 45 U/L   Creatinine   Result Value Ref Range    Creatinine 0.70 0.70 - 1.30 mg/dL    GFR MDRD Af Amer >60 >60 mL/min/1.73m2    GFR MDRD Non Af Amer >60 >60 mL/min/1.73m2   HM1 (CBC with Diff)   Result Value Ref Range    WBC 6.4 4.0 - 11.0 thou/uL    RBC 3.98 (L) 4.40 - 6.20 mill/uL    Hemoglobin 12.3 (L) 14.0 - 18.0 g/dL    Hematocrit 40.2 40.0 - 54.0 %     (H) 80 - 100 fL    MCH 30.9 27.0 - 34.0 pg    MCHC 30.6 (L) 32.0 - 36.0 g/dL    RDW 16.1 (H) 11.0 - 14.5 %    Platelets 341 140 - 440 thou/uL    MPV 10.0 8.5 - 12.5 fL    Neutrophils % 68 50 - 70 %    Lymphocytes % 15 (L) 20 - 40 %    Monocytes % 11 (H) 2 - 10 %    Eosinophils % 5 0 - 6 %    Basophils % 1 0 - 2 %    Neutrophils Absolute 4.3 2.0 - 7.7 thou/uL    Lymphocytes Absolute 1.0 0.8 - 4.4 thou/uL    Monocytes Absolute 0.7 0.0 - 0.9 thou/uL    Eosinophils Absolute 0.3 0.0 - 0.4 thou/uL    Basophils Absolute 0.1 0.0 - 0.2 thou/uL   ALT (SGPT)    Result Value Ref Range    ALT 39 0 - 45 U/L   Basic Metabolic Panel   Result Value Ref Range    Sodium 135 (L) 136 - 145 mmol/L    Potassium 4.2 3.5 - 5.0 mmol/L    Chloride 101 98 - 107 mmol/L    CO2 24 22 - 31 mmol/L    Anion Gap, Calculation 10 5 - 18 mmol/L    Glucose 102 70 - 125 mg/dL    Calcium 10.6 (H) 8.5 - 10.5 mg/dL    BUN 13 8 - 28 mg/dL    Creatinine 0.75 0.70 - 1.30 mg/dL    GFR MDRD Af Amer >60 >60 mL/min/1.73m2    GFR MDRD Non Af Amer >60 >60 mL/min/1.73m2   HM1 (CBC with Diff)   Result Value Ref Range    WBC 7.4 4.0 - 11.0 thou/uL    RBC 4.41 4.40 - 6.20 mill/uL    Hemoglobin 13.7 (L) 14.0 - 18.0 g/dL    Hematocrit 43.4 40.0 - 54.0 %    MCV 98 80 - 100 fL    MCH 31.1 27.0 - 34.0 pg    MCHC 31.6 (L) 32.0 - 36.0 g/dL    RDW 15.7 (H) 11.0 - 14.5 %    Platelets 353 140 - 440 thou/uL    MPV 9.4 8.5 - 12.5 fL    Neutrophils % 76 (H) 50 - 70 %    Lymphocytes % 10 (L) 20 - 40 %    Monocytes % 9 2 - 10 %    Eosinophils % 3 0 - 6 %    Basophils % 1 0 - 2 %    Neutrophils Absolute 5.7 2.0 - 7.7 thou/uL    Lymphocytes Absolute 0.8 0.8 - 4.4 thou/uL    Monocytes Absolute 0.7 0.0 - 0.9 thou/uL    Eosinophils Absolute 0.2 0.0 - 0.4 thou/uL    Basophils Absolute 0.1 0.0 - 0.2 thou/uL   ct shows decreased size of fluid collection    Assessment/Plan:  Severe abdominal pain with intractable nausea patient sent to the emergency room this morning currently back  -Multiple intra-abdominal polymicrobial abscesses status post drain placement 6/24/2020 with drain in primary left upper quadrant collection.  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  -Long-term antibiotic use toxicity monitoring  -Acute episode of dysphagia reported with patient having difficulty with swallowing of ibuprofen  -Acute peritonitis with CT showing pneumoperitoneum and abscess  -Status post CT-guided drain placement of the left upper quadrant fluid collection on  6/24/2020  -Status post sinogram on 6/26/2020 demonstrating a fistulous connection with the adjacent descending colon  - History of a jejunostomy tube takedown  -Underlying history of esophageal cancer  -Prior history of a minimally invasive esophagectomy on 8/30/2019  -Prior history of persistent leakage from the jejunostomy tube status post repair on 6/2/2020  -Dysphagia patient not eating and drinking other than boost  -Pain management patient reporting severe pain  -Moderate protein calorie malnutrition    Patient has been admitted to the TCU  He has been complaining of intractable nausea with abdominal pain.  [place] was contacted and at the request he was sent to the emergency room for imaging.  He did go this morning and currently is back in the nursing home.  An immediate CT of the abdomen and pelvis was done in the hospital but did not show any abnormality to explain patient's new symptoms  No obstruction  No inflammation noted on scanning  Interval drain placement and resolution of prior left upper quadrant abscess was noted again  No new significant findings were noted on imaging.  Recheck BMP done in the hospital shows a sodium of 137 improved from 135 yesterday and his calcium is also 9.3 which is an improvement GFR is more than 60 liver enzymes were also reviewed and his alkaline phosphatase was slightly elevated at 125 but his AST ALT were also normal.  Recheck hemoglobin was 12.7 with stable white count.  Lipase was 39.7.  No significant abnormality to explain his intractable nausea was noted and he has been discharged with Zofran.  On reviewing his concerns suspect that there might be some may be intermittent concern about dehydration or dysphagia contributing to his nausea and he was advised not to eat anything orally and we could give him IV fluid  This would keep him hydrated and see if his symptoms did resolve.  He is currently not too keen to pursue that option.  I did tell him just to stick to  boost today and not eat anything solid and see what happens he does not think is related to a pill or food item but if things do not improve we did mention that he could go n.p.o. and we could switch him to IV fluids for a few days to see if his symptoms improve.  Overall his imaging done in the emergency room actually is quite impressive for how quickly he has had improvement.  He will call [place] to make sure it is not antibiotic induced nausea.  In the meantime continue with his care plan monitor closely        Video-Visit Details    Type of service:  Video Visit    Video End Time (time video stopped): 12. 40 PM    Originating Location (pt. Location):[facility] [[id number]]    Distant Location (provider location):  [facility]     Mode of Communication:  Zoom Video Conference        MESHA Shaw

## 2021-06-09 NOTE — TELEPHONE ENCOUNTER
[facility] Nurse Triage Telephone Note      Provider: GIANCARLO Watkins  Facility: [facility]    Facility Type: TCU    Caller: Lashon  Call Back Number:  [phone]    Allergies: Metoprolol and Nitroglycerin    Reason for call: Nurse calling to report Heme 1, ALT, creat/GFR results.       Verbal Order/Direction given by Provider: Update patient's specialist at [place].    Provider giving order: GIANCARLO Watkins    Verbal order given to: Lashon Garcia RN

## 2021-06-09 NOTE — PROGRESS NOTES
"[facility]   Video Visit    Code Status: FULL CODE    Elias Elam is a 74 y.o. male who is being evaluated via a billable video visit.      The patient has been notified of following:     \"This video visit will be conducted via a call between you and your physician/provider. We have found that certain health care needs can be provided without the need for an in-person physical exam.  This service lets us provide the care you need with a video conversation.  If a prescription is necessary we can send it to the facility team.  If lab work is needed we can place an order through the facility team to have that test done at a later time.    If during the course of the call the physician/provider feels a video visit is not appropriate, you will not be charged for this service.\"     Physician/Provider has received verbal consent for a Video Visit from the patient? Yes    Patient would like the video invitation sent by: Send to: [employer]    Video Start Time: 2.35pm    Chief Complaint/Reason for Visit:  Chief Complaint   Patient presents with     H & P       HPI:   Elias is a 74 y.o. male who is admitted to the TCU as a transfer from the hospital.  Patient has a complex medical history including a past medical history of esophageal cancer  He initially was diagnosed last year and underwent surgical repair.  Since then he believes he is cancer free.    Unfortunately this left him with profound dysphagia and difficulty in swallowing.  He had a GJ tube placed  Unfortunately he continued to have multiple complications and in January 2020 he had a jejunostomy tube taken down.  Post tube removal he continued to complain of abdominal pain which worsened and he presented to the emergency room where he was noted on CT to have a pneumoperitoneum and an abscess.  He requested a transfer to [facility].    He has moderate malnutrition he is refusing to eat and drink normally he is subsisting on 5 cans of " boost plus day he has calculated his calories thinks he is getting about 1800 and is quite happy he believes he is getting weight    Pain management remains another challenge at home he was taking high doses of narcotics to manage his pain recently Vistaril was added to his regimen he has been using high doses of  oxycodone in addition he was also using ibuprofeN   as per his words doing it like candy he had no other recourse as per him  Pain management is an issue and he is quite upset with [facility] for discharging him without giving her adequate pain management he did have a virtual visit with them      I have reviewed and updated the patient's Past Medical History, includes a history of esophageal cancer.  Chronic pain and removal of jejunostomy tube    Social History, includes history of prior smoking no current history of alcohol or smoking.     Family History includes h/o of renal failure in his brother who is on HD ; sister has breast cancer   and Medication List.    ALLERGIES  Metoprolol and Nitroglycerin    Review of Systems  Constitutional: Negative.  Negative for fever, chills, he has activity change, appetite change and fatigue.   He is refusing medications  He is refusing any kind of oral intake and drinking only 5 cans of boost in a day  HENT: Negative for congestion and facial swelling.    Eyes: Negative for photophobia, redness and visual disturbance.   Respiratory: Negative for cough and chest tightness.    Cardiovascular: Negative for chest pain, palpitations and leg swelling.   Gastrointestinal: Negative for nausea, diarrhea, constipation, blood in stool and abdominal distention.   Has a CASANDRA drain which is intact and draining  Genitourinary: Negative.    Musculoskeletal: Negative.  Reporting weakness in a bandlike pain around his abdomen  Skin: Negative.    Neurological: Negative for dizziness, tremors, syncope, weakness, light-headedness and headaches.   Hematological: Does not bruise/bleed  easily.   Psychiatric/Behavioral: Patient has significant anxiety    Physical exam  Wt 178 lb /65 T98 P80  GENERAL: no acute distress. Cooperative in conversation. FRAIL  HEENT: pupils are equal, round and reactive. Oral mucosa is moist and intact.  RESP:Chest symmetric. Regular respiratory rate. No stridor.  ABD: Nondistended, soft.  HAS A CASANDRA DRAIN IN LUQ  EXTREMITIES: No lower extremity edema.   NEURO: non focal. Alert and oriented x3.   PSYCH: within normal limits. No depression ; HAS  anxiety.  SKIN: warm dry intact         Medication List:  Current Outpatient Medications   Medication Sig     acetaminophen (TYLENOL) 500 MG tablet Take 1,000 mg by mouth 3 (three) times a day.      atorvastatin (LIPITOR) 40 MG tablet Take 40 mg by mouth at bedtime.     calcium, as carbonate, (TUMS) 200 mg calcium (500 mg) chewable tablet Chew 1 tablet daily.     caspofungin (CANCIDAS) 50 mg injection Infuse 50 mg into a venous catheter at bedtime.     ertapenem sodium (ERTAPENEM INJ) Inject 10 mL as directed once.     esomeprazole (NEXIUM) 40 MG capsule Take 40 mg by mouth daily before breakfast.     famotidine (PEPCID) 40 MG tablet Take 40 mg by mouth daily.     hydrOXYzine pamoate (VISTARIL) 25 MG capsule Take 25 mg by mouth 3 (three) times a day.      ibuprofen (ADVIL,MOTRIN) 400 MG tablet Take 400 mg by mouth every 6 (six) hours as needed for pain.     metoprolol tartrate (LOPRESSOR) 6.25 MG/5 ML Susp Take 12.5 mg by mouth 2 (two) times a day.     metroNIDAZOLE (FLAGYL) 500 MG tablet Take 500 mg by mouth 3 (three) times a day.     oxyCODONE (ROXICODONE) 5 MG immediate release tablet Take 1 tablet (5 mg total) by mouth every 4 (four) hours as needed for pain.     pantoprazole (PROTONIX) 40 MG tablet Take 40 mg by mouth daily.     polyethylene glycol (MIRALAX) 17 gram packet Take 17 g by mouth daily.     senna-docusate (PERICOLACE) 8.6-50 mg tablet Take 1 tablet by mouth daily.       Labs:  Recent Results (from the past  240 hour(s))   Basic Metabolic Panel   Result Value Ref Range    Sodium 137 136 - 145 mmol/L    Potassium 3.6 3.5 - 5.0 mmol/L    Chloride 103 98 - 107 mmol/L    CO2 23 22 - 31 mmol/L    Anion Gap, Calculation 11 5 - 18 mmol/L    Glucose 115 70 - 125 mg/dL    Calcium 8.6 8.5 - 10.5 mg/dL    BUN 6 (L) 8 - 28 mg/dL    Creatinine 0.76 0.70 - 1.30 mg/dL    GFR MDRD Af Amer >60 >60 mL/min/1.73m2    GFR MDRD Non Af Amer >60 >60 mL/min/1.73m2   ALT (SGPT)   Result Value Ref Range    ALT 17 0 - 45 U/L   HM1 (CBC with Diff)   Result Value Ref Range    WBC 7.8 4.0 - 11.0 thou/uL    RBC 4.13 (L) 4.40 - 6.20 mill/uL    Hemoglobin 12.8 (L) 14.0 - 18.0 g/dL    Hematocrit 40.6 40.0 - 54.0 %    MCV 98 80 - 100 fL    MCH 31.0 27.0 - 34.0 pg    MCHC 31.5 (L) 32.0 - 36.0 g/dL    RDW 15.8 (H) 11.0 - 14.5 %    Platelets 362 140 - 440 thou/uL    MPV 9.1 8.5 - 12.5 fL    Neutrophils % 82 (H) 50 - 70 %    Lymphocytes % 7 (L) 20 - 40 %    Monocytes % 8 2 - 10 %    Eosinophils % 1 0 - 6 %    Basophils % 1 0 - 2 %    Neutrophils Absolute 6.4 2.0 - 7.7 thou/uL    Lymphocytes Absolute 0.6 (L) 0.8 - 4.4 thou/uL    Monocytes Absolute 0.6 0.0 - 0.9 thou/uL    Eosinophils Absolute 0.1 0.0 - 0.4 thou/uL    Basophils Absolute 0.0 0.0 - 0.2 thou/uL       Assessment/Plan:  -Acute peritonitis with CT showing pneumoperitoneum and abscess  -Status post CT-guided drain placement of the left upper quadrant fluid collection on 6/24/2020  -Status post sinogram on 6/26/2020 demonstrating a fistulous connection with the adjacent descending colon  - History of a jejunostomy tube takedown  -Underlying history of esophageal cancer  -Prior history of a minimally invasive esophagectomy on 8/30/2019  -Prior history of persistent leakage from the jejunostomy tube status post repair on 6/2/2020  -Dysphagia patient not eating and drinking other than boost  -Pain management patient reporting severe pain  Patient has been admitted to the TCU  Unfortunately not doing very  well and not happy with his stay.  Drain was examined and there appears to be some fluid and air  Unfortunately he is not quite happy with the outcome and believes that there is a high possibility that he is not being treated adequately  He he feels that like in the past this may be ignored and he may be discharged home without being adequately treated.  Reassurance given  He has a follow-up with [place] tomorrow for repeat CT of his abdomen  I told him they will monitor it clinically.  Labs also reviewed with him including improvement in white count.  He is on antibiotics he will be getting them IV per duration determined by [facility].    He thinks he will have repeat surgical intervention once he is done with antibiotics  He is also quite upset with his pain management.  He continues to complain of bandlike pain in his abdomen.  He is requesting that we increase his pain pills he was wondering why they are scheduled he was getting oxycodone with Tylenol every 4 hours alternating with oxycodone with ibuprofen every 4 hours.  Vistaril has been added to his regimen with significant improvement.  Warning signs of using so much ibuprofen were given to him but he is adamant.  A care conference has been done with him with his surgical team in [place] and will await their recommendation he would like to see what they have to say for pain management before asking for any further changes.  I also talked to him about his dysphagia issues and he will not eat and drink anything other than boost plus he thinks that is giving him enough intake.  He told me unfortunately if he eats anything even puréed diet it just sits there and he cannot process it so he does not want to even try.  I did mention that as long as his weight stays stable I am okay with that    Nursing did update me that he has missed a dose of his IV antibiotics caspofungin and unfortunately received a double dose of ertapenem.  We will monitor him for side effects  and give him an extra dose of his caspofungin  Per infectious disease there is no end date he will follow-up with repeat imaging and then it will be determined how long he will remain on his 3 antibiotics including ertapenem metronidazole and caspofungin    Medication compliance reviewed with him he is not taking several of his meds he is absolutely refusing to take metoprolol  I did encourage him to take it but he says he has allergies to it he is not sure why [place] insisted on giving it to him  In addition he is refusing his Tylenol  He will let staff know what he does not plan to take and we can either discontinue them or switch them    Video-Visit Details    Type of service:  Video Visit    Video End Time (time video stopped): 3PM    Originating Location (pt. Location):[facility] [[id number]]    Distant Location (provider location):  [facility]     Mode of Communication:  Zoom Video Conference        MESHA Shaw

## 2021-06-09 NOTE — PROGRESS NOTES
"[facility]   Video Visit    Code Status: Full    Elias Elam is a 74 y.o. male who is being evaluated via a billable video visit.      The patient has been notified of following:     \"This video visit will be conducted via a call between you and your physician/provider. We have found that certain health care needs can be provided without the need for an in-person physical exam.  This service lets us provide the care you need with a video conversation.  If a prescription is necessary we can send it to the facility team.  If lab work is needed we can place an order through the facility team to have that test done at a later time.    If during the course of the call the physician/provider feels a video visit is not appropriate, you will not be charged for this service.\"     Physician/provider has received verbal consent for a Video Visit from the patient? Yes      Video Start Time: 1003  Chief Complaint/Reason for Visit:  Chief Complaint   Patient presents with     Review Of Multiple Medical Conditions     nv       HPI:   Elias is a 74 y.o. male who is an admission from [facility] to the TCU. Seen for review of multiple medical conditions.   He originally presented with abdominal pain to [place] ED but was transferred to the [facility].  He returned to the weakness and fatigue and having extreme abdominal pain.  A CAT scan was completed and it was found that he had a normal.  Total knee M and complex fluid collection his abdominal exam included insertion of a CASANDRA bulb on her CAT guided the drain was placed and he was also put on Vanco and Zosyn.  A final diagnosis is abdominal abscess.  He does have a drain in place with red rubra drainage.    Today he reports abd pain a bit better, to [facility] tomorrow. He was offerred IVF and reports he is taking 5 or so ensures a day just not eating solids and declines. He does self dialate his esoughagus and does not feel that is his issue. " Declines intervention.  I have reviewed and updated the patient's Past Medical History, Social History, Family History and Medication List.    ALLERGIES  Metoprolol and Nitroglycerin    Review of Systems    Currently, no fever, chills, or rigors. Does not have any visual or hearing problems. Denies any chest pain, headaches, palpitations, lightheadedness, dizziness, shortness of breath, or cough. Appetite is poor. Denies any GERD symptoms. Does any difficulty with swallowing, Denies any abdominal pain, constipation or loose stools. No insomnia. No active bleeding      Vitals:    07/23/20 0914   BP: 96/51   Pulse: (!) 48   Temp: 97.1  F (36.2  C)   Weight: 161 lb (73 kg)         Assessment/Plan:    ICD-10-CM    1. Lower abdominal pain  R10.30    2. Decreased oral intake  R63.8       1.  Lower abdominal pain; somewhat stable, reports today better than it has been no vomitting but some nausea. Reporting oral intake stable    2.  Oral intake; Has reported no further vomitting, nausea remains. Offerred IVF declined feels intake with ensure adequete. To see [place] on 7/23/20      Video-Visit Details    Type of service:  Video Visit    Video End Time (time video stopped): 1010    Originating Location (pt. Location):[facility] [[id number]]    Distant Location (provider location):  [facility]         Odalis Parikh CNP

## 2021-06-09 NOTE — PROGRESS NOTES
"[facility]   Video Visit    Code Status: Full    Elias Elam is a 74 y.o. male who is being evaluated via a billable video visit.      The patient has been notified of following:     \"This video visit will be conducted via a call between you and your physician/provider. We have found that certain health care needs can be provided without the need for an in-person physical exam.  This service lets us provide the care you need with a video conversation.  If a prescription is necessary we can send it to the facility team.  If lab work is needed we can place an order through the facility team to have that test done at a later time.    If during the course of the call the physician/provider feels a video visit is not appropriate, you will not be charged for this service.\"     Physician/provider has received verbal consent for a Video Visit from the patient? Yes      Video Start Time: 1001  Chief Complaint/Reason for Visit:  Chief Complaint   Patient presents with     Review Of Multiple Medical Conditions     initiate care/pain managment       HPI:   Elias is a 74 y.o. male who is an admission from [facility] to the TCU.  I think initiation of care with medical care for seniors.  He originally presented with abdominal pain to [place] ED but was transferred to the [facility].  He returned to the weakness and fatigue and having extreme abdominal pain.  A CAT scan was completed and it was found that he had a normal.  Total knee M and complex fluid collection his abdominal exam included insertion of a CASANDRA bulb on her CAT guided the drain was placed and he was also put on Vanco and Zosyn.  A final diagnosis is abdominal abscess.  He does have a drain in place with red rubra drainage.  Neck shift reported there is 90 cc out.  Nursing will call surgeon today just to update on the color and amount of drainage that continues.. He is a bit angry this a.m. as he would like oxycodone 10 and only has " "5 ordered.  Was having a lot of pain and I gave a one-time order for an extra 5 so he had a total of 10 mg this a.m. however I will continue him on 5 mg of Aloxi every 4 as needed suggest nursing manager talk to him about his concerns with not receiving pain meds in a timely manner per his perspective.  We also discussed adjusting pain medications from PRN to scheduled such as scheduling Tylenol, which she says is not effective at all.  I did talk to him about having Tylenol Vistaril combination which he felt was worth trying.  We also did discuss about a possible muscle relaxant if pain still continues.  As reported he is angry about his pain management and feels he could go home and take \"what ever he wanted to take.  I did explain to him the risks and benefits of self-medicating and using medication that is not appropriate such as a lot of ibuprofen can cause a GI bleed.  At this juncture he is angry and did not care to listen to me.      I have reviewed and updated the patient's Past Medical History, Social History, Family History and Medication List.    ALLERGIES  Metoprolol and Nitroglycerin    Review of Systems  Very focused on pain to lower left abdomen, needed much redirection to discuss ROS but :  Currently, no fever, chills, or rigors. Does not have any visual or hearing problems. Denies any chest pain, headaches, palpitations, lightheadedness, dizziness, shortness of breath, or cough. Appetite is good. Denies any GERD symptoms. Does any difficulty with swallowing, Denies any abdominal pain, constipation or loose stools. No insomnia. No active bleeding      Vitals:    07/04/20 1056   BP: 108/75   Pulse: (!) 58   Temp: 98.4  F (36.9  C)   Weight: 178 lb (80.7 kg)         Assessment/Plan:    ICD-10-CM    1. Lower abdominal pain  R10.30    2. Esophageal obstruction  K22.2    3. ACP (advance care planning)  Z71.89       1.  Lower abdominal pain; okay to give an extra dose of Oxy 5 milligrams a.m. x1 dose " "only.  We will adjust the Tylenol to scheduled and add Vistaril 25 mg p.o.  3 times daily.. We hope that the pain will be under control so patient can participate in PT and OT and skilled nursing will continue to manage chronic medical conditions.  Done right now \"yeah    2.  Esophageal obstruction; patient has a self dilatation kit he will have somebody bring from home as he feels that he is starting to close a little bit and having more trouble swallowing.    3 advanced care planning discussed advanced care planning with patient and his desires to be full code this is been noted in chart.  Video-Visit Details    Type of service:  Video Visit    Video End Time (time video stopped): 1028    Originating Location (pt. Location):[facility] [[id number]]    Distant Location (provider location):  [facility]     Mode of Communication:  Zoom Video Conference  Greater than 45 minutes spent with pt as we discussed her POC including medication r/t pain management , TCU routines and ACP which is Full Code.      Odalis Parikh, CNP  "

## 2021-06-09 NOTE — PROGRESS NOTES
"[facility]   Video Visit    Code Status: FULL CODE    Elias Elam is a 74 y.o. male who is being evaluated via a billable video visit.      The patient has been notified of following:     \"This video visit will be conducted via a call between you and your physician/provider. We have found that certain health care needs can be provided without the need for an in-person physical exam.  This service lets us provide the care you need with a video conversation.  If a prescription is necessary we can send it to the facility team.  If lab work is needed we can place an order through the facility team to have that test done at a later time.    If during the course of the call the physician/provider feels a video visit is not appropriate, you will not be charged for this service.\"     Physician/Provider has received verbal consent for a Video Visit from the patient? Yes    Patient would like the video invitation sent by: Send to: [employer]    Video Start Time: 11.30 am    Chief Complaint/Reason for Visit:  Chief Complaint   Patient presents with     Review Of Multiple Medical Conditions   Follow-up on CT scan and pain management    HPI:   Elias is a 74 y.o. male who is admitted to the TCU as a transfer from the hospital.  Patient has a complex medical history including a past medical history of esophageal cancer  He initially was diagnosed last year and underwent surgical repair.  Since then he believes he is cancer free.    Unfortunately this left him with profound dysphagia and difficulty in swallowing.  He had a GJ tube placed  Unfortunately he continued to have multiple complications and in January 2020 he had a jejunostomy tube taken down.  Post tube removal he continued to complain of abdominal pain which worsened and he presented to the emergency room where he was noted on CT to have a pneumoperitoneum and an abscess.  He requested a transfer to [facility].  He did have a follow-up done " with repeat imaging done in [facility] he continues to believe that his abscesses have not improved in his opinion however he was told that he had improvement in imaging which he discounts      He has moderate malnutrition he is refusing to eat and drink normally he is subsisting on 5 cans of boost plus day he has calculated his calories thinks he is getting about 1800 and is quite happy he believes he is getting weight  Unfortunately he is not able to eat or drink anything  Yesterday he did get an ibuprofen and that got stuck  He came back to the nursing home he has self dilatation kit available and he self dilated after numbing his mouth he believes things got a little better  I did ask him if he would like his medication crushed to prevent this outcome he does not want to do that either      Pain management remains another challenge at home he was taking high doses of narcotics to manage his pain recently Vistaril was added to his regimen he has been using high doses of  oxycodone in addition he was also using ibuprofeN   He is feeling somewhat better regarding his pain pills unfortunately after the difficulty he had with ibuprofen I am hesitant to schedule that medication for him      I have reviewed and updated the patient's Past Medical History, includes a history of esophageal cancer.  Chronic pain and removal of jejunostomy tube    Social History, includes history of prior smoking no current history of alcohol or smoking.     Family History includes h/o of renal failure in his brother who is on HD ; sister has breast cancer   and Medication List.    ALLERGIES  Metoprolol and Nitroglycerin    Review of Systems  Constitutional: Negative.  Negative for fever, chills, he has activity change, appetite change and fatigue.   He is refusing medications  He is refusing any kind of oral intake and drinking only 5 cans of boost in a day  Also reporting persistent dysphagia and difficulty with swallowing ibuprofen last  night  He ended up self dilating his throat  HENT: Negative for congestion and facial swelling.    Eyes: Negative for photophobia, redness and visual disturbance.   Respiratory: Negative for cough and chest tightness.    Cardiovascular: Negative for chest pain, palpitations and leg swelling.   Gastrointestinal: Negative for nausea, diarrhea, constipation, blood in stool and abdominal distention.   Has a CASANDRA drain which is intact and draining  Genitourinary: Negative.    Musculoskeletal: Negative.  Reporting weakness in a bandlike pain around his abdomen  Skin: Negative.    Neurological: Negative for dizziness, tremors, syncope, weakness, light-headedness and headaches.   Hematological: Does not bruise/bleed easily.   Psychiatric/Behavioral: Patient has significant anxiety    Physical exam  Wt 178 lb  / 55 T98 P80  GENERAL: no acute distress. Cooperative in conversation. FRAIL  HEENT: pupils are equal, round and reactive. Oral mucosa is moist and intact.  RESP:Chest symmetric. Regular respiratory rate. No stridor.  ABD: Nondistended, soft.  HAS A CASANDRA DRAIN IN LUQ  EXTREMITIES: No lower extremity edema.   NEURO: non focal. Alert and oriented x3.   PSYCH: within normal limits. No depression ; HAS  anxiety.  SKIN: warm dry intact         Medication List:  Current Outpatient Medications   Medication Sig     acetaminophen (TYLENOL) 500 MG tablet Take 1,000 mg by mouth 3 (three) times a day.      atorvastatin (LIPITOR) 40 MG tablet Take 40 mg by mouth at bedtime.     calcium, as carbonate, (TUMS) 200 mg calcium (500 mg) chewable tablet Chew 1 tablet daily.     caspofungin (CANCIDAS) 50 mg injection Infuse 50 mg into a venous catheter at bedtime.     ertapenem sodium (ERTAPENEM INJ) Inject 10 mL as directed once.     esomeprazole (NEXIUM) 40 MG capsule Take 40 mg by mouth daily before breakfast.     famotidine (PEPCID) 40 MG tablet Take 40 mg by mouth daily.     hydrOXYzine pamoate (VISTARIL) 25 MG capsule Take 25 mg by  mouth 3 (three) times a day.      ibuprofen (ADVIL,MOTRIN) 400 MG tablet Take 400 mg by mouth every 6 (six) hours as needed for pain.     metoprolol tartrate (LOPRESSOR) 6.25 MG/5 ML Susp Take 12.5 mg by mouth 2 (two) times a day.     metroNIDAZOLE (FLAGYL) 500 MG tablet Take 500 mg by mouth 3 (three) times a day.     oxyCODONE (ROXICODONE) 5 MG immediate release tablet Take 1 tablet (5 mg total) by mouth every 4 (four) hours as needed for pain.     pantoprazole (PROTONIX) 40 MG tablet Take 40 mg by mouth daily.     polyethylene glycol (MIRALAX) 17 gram packet Take 17 g by mouth daily.     senna-docusate (PERICOLACE) 8.6-50 mg tablet Take 1 tablet by mouth daily.       Labs:  Recent Results (from the past 240 hour(s))   Basic Metabolic Panel   Result Value Ref Range    Sodium 137 136 - 145 mmol/L    Potassium 3.6 3.5 - 5.0 mmol/L    Chloride 103 98 - 107 mmol/L    CO2 23 22 - 31 mmol/L    Anion Gap, Calculation 11 5 - 18 mmol/L    Glucose 115 70 - 125 mg/dL    Calcium 8.6 8.5 - 10.5 mg/dL    BUN 6 (L) 8 - 28 mg/dL    Creatinine 0.76 0.70 - 1.30 mg/dL    GFR MDRD Af Amer >60 >60 mL/min/1.73m2    GFR MDRD Non Af Amer >60 >60 mL/min/1.73m2   ALT (SGPT)   Result Value Ref Range    ALT 17 0 - 45 U/L   HM1 (CBC with Diff)   Result Value Ref Range    WBC 7.8 4.0 - 11.0 thou/uL    RBC 4.13 (L) 4.40 - 6.20 mill/uL    Hemoglobin 12.8 (L) 14.0 - 18.0 g/dL    Hematocrit 40.6 40.0 - 54.0 %    MCV 98 80 - 100 fL    MCH 31.0 27.0 - 34.0 pg    MCHC 31.5 (L) 32.0 - 36.0 g/dL    RDW 15.8 (H) 11.0 - 14.5 %    Platelets 362 140 - 440 thou/uL    MPV 9.1 8.5 - 12.5 fL    Neutrophils % 82 (H) 50 - 70 %    Lymphocytes % 7 (L) 20 - 40 %    Monocytes % 8 2 - 10 %    Eosinophils % 1 0 - 6 %    Basophils % 1 0 - 2 %    Neutrophils Absolute 6.4 2.0 - 7.7 thou/uL    Lymphocytes Absolute 0.6 (L) 0.8 - 4.4 thou/uL    Monocytes Absolute 0.6 0.0 - 0.9 thou/uL    Eosinophils Absolute 0.1 0.0 - 0.4 thou/uL    Basophils Absolute 0.0 0.0 - 0.2 thou/uL    ct shows decreased size of fluid collection    Assessment/Plan:  -Multiple intra-abdominal polymicrobial abscesses status post drain placement 6/24/2020 with drain in primary left upper quadrant collection.  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  -Long-term antibiotic use toxicity monitoring  -Acute episode of dysphagia reported with patient having difficulty with swallowing of ibuprofen  -Acute peritonitis with CT showing pneumoperitoneum and abscess  -Status post CT-guided drain placement of the left upper quadrant fluid collection on 6/24/2020  -Status post sinogram on 6/26/2020 demonstrating a fistulous connection with the adjacent descending colon  - History of a jejunostomy tube takedown  -Underlying history of esophageal cancer  -Prior history of a minimally invasive esophagectomy on 8/30/2019  -Prior history of persistent leakage from the jejunostomy tube status post repair on 6/2/2020  -Dysphagia patient not eating and drinking other than boost  -Pain management patient reporting severe pain  -Moderate protein calorie malnutrition    Patient has been admitted to the TCU  He did have a follow-up done with his infectious disease specialist in [facility] yesterday.  He continues to complain that he thinks that his abscesses are no longer any better.  Copy of the imaging done on 7/8/2020 were reviewed with him his CT abdomen and pelvis shows a more first left upper quadrant gas and fluid collection related to the colonic fistula has decreased in size with a percutaneous drain in place.  There is also ongoing left upper quadrant inflammation and ongoing fistula.  There is even evidence of impaired biliary drainage small locules of fluid related to prior jejunal feeding tube site without new drainable collection.  On 7/8/2020 also had a sinogram which showed a decrease size in the left upper quadrant fluid collection with  persistent colonic fistula.  No drain manipulation.  They recommended continued flushing with fluids twice daily.  He will also have repeat imaging and sinogram done again in 2 weeks.  In addition based on his cultures and sensitivities infectious disease has recommended continuation of ertapenem until his next visit in 2 weeks.  Flagyl has been discontinued.  Caspofungin has been discontinued.  He will be on voriconazole 200 mg twice daily.  They have recommended that he have a CT abdomen pelvis and sinogram in 2 weeks and an EKG also for QT monitoring.  He will continue with his weekly labs.  Based on reviewing their imaging and chart it seems that his fluid collection has decreased the patient remains adamant that that is not the case.  He was advised to follow-up with his surgeon for further queries.  In addition change in antibiotics were reviewed with him  In spite of his severe pain issues no change in pain management has been made.  I did suggest to him that maybe we could start crushing his pills he had a profound episode of dysphagia when his pill got stuck in his throat  And he could not bring it up nor could he swallow it.  He is adamant that he will not have the change made  He did do a self dilatation using his own dilators and he believes that situation has resolved  Unfortunately he is resistant to any changes in his treatment plan so we will leave the situation as it is  Monitor his clinical symptoms continue with his PT OT and rehab  He can discuss this further with [facility]  He is upset that he cannot go home  I did explain to him that he requires close monitoring and his medical plan is quite complex currently  Also he is on IV antibiotics and it would be beneficial for him to stay in the TCU    Video-Visit Details    Type of service:  Video Visit    Video End Time (time video stopped): 12  .05 pM    Originating Location (pt. Location):[facility] [[id number]]    Distant Location  (provider location):  [facility]     Mode of Communication:  Zoom Video Conference        MESHA Shaw

## 2021-06-16 PROBLEM — K22.2 ESOPHAGEAL OBSTRUCTION: Status: ACTIVE | Noted: 2020-05-01

## 2021-06-16 PROBLEM — Z43.4: Status: ACTIVE | Noted: 2020-06-02

## 2021-06-16 PROBLEM — R63.8 DECREASED ORAL INTAKE: Status: ACTIVE | Noted: 2020-07-23

## 2021-06-16 PROBLEM — Z71.89 ACP (ADVANCE CARE PLANNING): Status: ACTIVE | Noted: 2020-07-04

## 2021-06-16 PROBLEM — R10.9 ABDOMINAL PAIN: Status: ACTIVE | Noted: 2019-11-04

## 2021-06-20 NOTE — LETTER
"Letter by Quin Cook MB[employer] at      Author: Quin Cook [employer] Service: -- Author Type: --    Filed:  Encounter Date: 7/16/2020 Status: (Other)         Patient: Elias Elam   MR Number: [id number]   YOB: 1945   Date of Visit: 7/16/2020     [facility]   Video Visit    Code Status: FULL CODE    Elias Elam is a 74 y.o. male who is being evaluated via a billable video visit.      The patient has been notified of following:     \"This video visit will be conducted via a call between you and your physician/provider. We have found that certain health care needs can be provided without the need for an in-person physical exam.  This service lets us provide the care you need with a video conversation.  If a prescription is necessary we can send it to the facility team.  If lab work is needed we can place an order through the facility team to have that test done at a later time.    If during the course of the call the physician/provider feels a video visit is not appropriate, you will not be charged for this service.\"     Physician/Provider has received verbal consent for a Video Visit from the patient? Yes    Patient would like the video invitation sent by: Send to: [employer]    Video Start Time: 9.52 am    Chief Complaint/Reason for Visit:  Chief Complaint   Patient presents with   ? Review Of Multiple Medical Conditions   Follow-up on CT scan and pain management    HPI:   Elias is a 74 y.o. male who is admitted to the TCU as a transfer from the hospital.  Patient has a complex medical history including a past medical history of esophageal cancer  He initially was diagnosed last year and underwent surgical repair.  Since then he believes he is cancer free.    Unfortunately this left him with profound dysphagia and difficulty in swallowing.  He had a GJ tube removal done.  Unfortunately due to persistent dysphagia currently he has a dilator which he is using himself to open up " his esophagus.  He is drinking boost and refusing any solid diet as he does not tolerate he also has concerns about medications getting stuck and has to be very careful with what he eats and drinks but he is maintaining a stable weight as per him    Pain management reviewed with him he has not asked for a single narcotics since the 14th.  He feels his pain is very well controlled just with Tylenol as well as Vistaril  Suspect that as his infection is resolving his pain is going down quite nicely clinically also he feels better.  He is ambulating without any assist device    He also has repeat imaging scheduled in the [facility] in 2 weeks he is aware of that this is to ensure that his fluid collections are getting smaller.  He has a drain in his left upper quadrant which is producing some fluid every day as per him.  In the meantime he is hoping that he can discharge home we did talk about his antibiotics and he knows they may not be covered by insurance      I have reviewed and updated the patient's Past Medical History, includes a history of esophageal cancer.  Chronic pain and removal of jejunostomy tube    Social History, includes history of prior smoking no current history of alcohol or smoking.     Family History includes h/o of renal failure in his brother who is on HD ; sister has breast cancer   and Medication List.    ALLERGIES  Metoprolol and Nitroglycerin    Review of Systems  Constitutional: Negative.  Negative for fever, chills, he has activity change, appetite change and fatigue.   He is refusing medications  He is refusing any kind of oral intake and drinking only 5 cans of boost in a day  Also reporting persistent dysphagia and difficulty with swallowing   He ended up self dilating his throat  HE DOES NOT EAT SOLID FOODS AT ALL  HENT: Negative for congestion and facial swelling.    Eyes: Negative for photophobia, redness and visual disturbance.   Respiratory: Negative for cough and chest tightness.     Cardiovascular: Negative for chest pain, palpitations and leg swelling.   Gastrointestinal: Negative for nausea, diarrhea, constipation, blood in stool and abdominal distention.   Has a CASANDRA drain which is intact and draining  Genitourinary: Negative.    Musculoskeletal: Negative.  Reporting weakness in a bandlike pain around his abdomen has pretty much improved and now he is ambulating and has no pain  Skin: Negative.    Neurological: Negative for dizziness, tremors, syncope, weakness, light-headedness and headaches.   Hematological: Does not bruise/bleed easily.   Psychiatric/Behavioral: Patient has had a significant improvement in mood    Physical exam  Wt 178 lb  / 55 T98 P80  GENERAL: no acute distress. Cooperative in conversation. FRAIL  HEENT: pupils are equal, round and reactive. Oral mucosa is moist and intact.  RESP:Chest symmetric. Regular respiratory rate. No stridor.  ABD: Nondistended, soft.  HAS A CASANDRA DRAIN IN LUQ  EXTREMITIES: No lower extremity edema.   NEURO: non focal. Alert and oriented x3.   PSYCH: within normal limits. No depression ; HAS  anxiety.  SKIN: warm dry intact         Medication List:  Current Outpatient Medications   Medication Sig   ? acetaminophen (TYLENOL) 500 MG tablet Take 1,000 mg by mouth 3 (three) times a day.    ? atorvastatin (LIPITOR) 40 MG tablet Take 40 mg by mouth at bedtime.   ? calcium, as carbonate, (TUMS) 200 mg calcium (500 mg) chewable tablet Chew 1 tablet daily.   ? caspofungin (CANCIDAS) 50 mg injection Infuse 50 mg into a venous catheter at bedtime.   ? ertapenem sodium (ERTAPENEM INJ) Inject 10 mL as directed once.   ? esomeprazole (NEXIUM) 40 MG capsule Take 40 mg by mouth daily before breakfast.   ? famotidine (PEPCID) 40 MG tablet Take 40 mg by mouth daily.   ? hydrOXYzine pamoate (VISTARIL) 25 MG capsule Take 25 mg by mouth 3 (three) times a day.    ? ibuprofen (ADVIL,MOTRIN) 400 MG tablet Take 400 mg by mouth every 6 (six) hours as needed for pain.    ? metoprolol tartrate (LOPRESSOR) 6.25 MG/5 ML Susp Take 12.5 mg by mouth 2 (two) times a day.   ? metroNIDAZOLE (FLAGYL) 500 MG tablet Take 500 mg by mouth 3 (three) times a day.   ? oxyCODONE (ROXICODONE) 5 MG immediate release tablet Take 1 tablet (5 mg total) by mouth every 4 (four) hours as needed for pain.   ? pantoprazole (PROTONIX) 40 MG tablet Take 40 mg by mouth daily.   ? polyethylene glycol (MIRALAX) 17 gram packet Take 17 g by mouth daily.   ? senna-docusate (PERICOLACE) 8.6-50 mg tablet Take 1 tablet by mouth daily.       Labs:  Recent Results (from the past 240 hour(s))   ALT (SGPT)   Result Value Ref Range    ALT 11 0 - 45 U/L   Creatinine   Result Value Ref Range    Creatinine 0.70 0.70 - 1.30 mg/dL    GFR MDRD Af Amer >60 >60 mL/min/1.73m2    GFR MDRD Non Af Amer >60 >60 mL/min/1.73m2   HM1 (CBC with Diff)   Result Value Ref Range    WBC 6.4 4.0 - 11.0 thou/uL    RBC 3.98 (L) 4.40 - 6.20 mill/uL    Hemoglobin 12.3 (L) 14.0 - 18.0 g/dL    Hematocrit 40.2 40.0 - 54.0 %     (H) 80 - 100 fL    MCH 30.9 27.0 - 34.0 pg    MCHC 30.6 (L) 32.0 - 36.0 g/dL    RDW 16.1 (H) 11.0 - 14.5 %    Platelets 341 140 - 440 thou/uL    MPV 10.0 8.5 - 12.5 fL    Neutrophils % 68 50 - 70 %    Lymphocytes % 15 (L) 20 - 40 %    Monocytes % 11 (H) 2 - 10 %    Eosinophils % 5 0 - 6 %    Basophils % 1 0 - 2 %    Neutrophils Absolute 4.3 2.0 - 7.7 thou/uL    Lymphocytes Absolute 1.0 0.8 - 4.4 thou/uL    Monocytes Absolute 0.7 0.0 - 0.9 thou/uL    Eosinophils Absolute 0.3 0.0 - 0.4 thou/uL    Basophils Absolute 0.1 0.0 - 0.2 thou/uL   ct shows decreased size of fluid collection    Assessment/Plan:  -Multiple intra-abdominal polymicrobial abscesses status post drain placement 6/24/2020 with drain in primary left upper quadrant collection.  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  -Long-term antibiotic use toxicity monitoring  -Acute  episode of dysphagia reported with patient having difficulty with swallowing of ibuprofen  -Acute peritonitis with CT showing pneumoperitoneum and abscess  -Status post CT-guided drain placement of the left upper quadrant fluid collection on 6/24/2020  -Status post sinogram on 6/26/2020 demonstrating a fistulous connection with the adjacent descending colon  - History of a jejunostomy tube takedown  -Underlying history of esophageal cancer  -Prior history of a minimally invasive esophagectomy on 8/30/2019  -Prior history of persistent leakage from the jejunostomy tube status post repair on 6/2/2020  -Dysphagia patient not eating and drinking other than boost  -Pain management patient reporting severe pain  -Moderate protein calorie malnutrition    Patient has been admitted to the TCU  He is quite happy and pleased with his outcomes and his mood has improved significantly.  CT imaging did reveal that his fistula size has decreased.  He plans to be compliant with his follow-up imaging in 2 weeks.  He is now on a different round of antibiotics and remains afebrile with significant improvement overall in his pain  He is hardly using narcotics for the last 48 hours.  Mood has improved now that his pain has gone down with less anxiety reported.  Discharge planning reviewed he is hoping that his insurance will cover his antibiotics and he can manage at home.  I told him he has a chronic issue and would benefit from seeing [place] closely and getting done with his antibiotics and staying compliant he may still be looking at surgery.  He is ambulating independently        Video-Visit Details    Type of service:  Video Visit    Video End Time (time video stopped): 10  .05 AM    Originating Location (pt. Location):[facility] [[id number]]    Distant Location (provider location):  [facility]     Mode of Communication:  Zoom Video Conference        MESHA Shaw

## 2021-06-20 NOTE — LETTER
"Letter by Quin Cook MBBS at      Author: Quin Cook MBBS Service: -- Author Type: --    Filed:  Encounter Date: 7/7/2020 Status: (Other)         Patient: Elias Elam   MR Number: [id number]   YOB: 1945   Date of Visit: 7/7/2020     [facility]   Video Visit    Code Status: FULL CODE    Elias Elam is a 74 y.o. male who is being evaluated via a billable video visit.      The patient has been notified of following:     \"This video visit will be conducted via a call between you and your physician/provider. We have found that certain health care needs can be provided without the need for an in-person physical exam.  This service lets us provide the care you need with a video conversation.  If a prescription is necessary we can send it to the facility team.  If lab work is needed we can place an order through the facility team to have that test done at a later time.    If during the course of the call the physician/provider feels a video visit is not appropriate, you will not be charged for this service.\"     Physician/Provider has received verbal consent for a Video Visit from the patient? Yes    Patient would like the video invitation sent by: Send to: [employer]    Video Start Time: 2.35pm    Chief Complaint/Reason for Visit:  Chief Complaint   Patient presents with   ? H & P       HPI:   Elias is a 74 y.o. male who is admitted to the TCU as a transfer from the hospital.  Patient has a complex medical history including a past medical history of esophageal cancer  He initially was diagnosed last year and underwent surgical repair.  Since then he believes he is cancer free.    Unfortunately this left him with profound dysphagia and difficulty in swallowing.  He had a GJ tube placed  Unfortunately he continued to have multiple complications and in January 2020 he had a jejunostomy tube taken down.  Post tube removal he continued to complain of abdominal pain which " worsened and he presented to the emergency room where he was noted on CT to have a pneumoperitoneum and an abscess.  He requested a transfer to [facility].    He has moderate malnutrition he is refusing to eat and drink normally he is subsisting on 5 cans of boost plus day he has calculated his calories thinks he is getting about 1800 and is quite happy he believes he is getting weight    Pain management remains another challenge at home he was taking high doses of narcotics to manage his pain recently Vistaril was added to his regimen he has been using high doses of  oxycodone in addition he was also using ibuprofeN   as per his words doing it like candy he had no other recourse as per him  Pain management is an issue and he is quite upset with [facility] for discharging him without giving her adequate pain management he did have a virtual visit with them      I have reviewed and updated the patient's Past Medical History, includes a history of esophageal cancer.  Chronic pain and removal of jejunostomy tube    Social History, includes history of prior smoking no current history of alcohol or smoking.     Family History includes h/o of renal failure in his brother who is on HD ; sister has breast cancer   and Medication List.    ALLERGIES  Metoprolol and Nitroglycerin    Review of Systems  Constitutional: Negative.  Negative for fever, chills, he has activity change, appetite change and fatigue.   He is refusing medications  He is refusing any kind of oral intake and drinking only 5 cans of boost in a day  HENT: Negative for congestion and facial swelling.    Eyes: Negative for photophobia, redness and visual disturbance.   Respiratory: Negative for cough and chest tightness.    Cardiovascular: Negative for chest pain, palpitations and leg swelling.   Gastrointestinal: Negative for nausea, diarrhea, constipation, blood in stool and abdominal distention.   Has a CASANDRA drain which is intact and  draining  Genitourinary: Negative.    Musculoskeletal: Negative.  Reporting weakness in a bandlike pain around his abdomen  Skin: Negative.    Neurological: Negative for dizziness, tremors, syncope, weakness, light-headedness and headaches.   Hematological: Does not bruise/bleed easily.   Psychiatric/Behavioral: Patient has significant anxiety    Physical exam  Wt 178 lb /65 T98 P80  GENERAL: no acute distress. Cooperative in conversation. FRAIL  HEENT: pupils are equal, round and reactive. Oral mucosa is moist and intact.  RESP:Chest symmetric. Regular respiratory rate. No stridor.  ABD: Nondistended, soft.  HAS A CASANDRA DRAIN IN LUQ  EXTREMITIES: No lower extremity edema.   NEURO: non focal. Alert and oriented x3.   PSYCH: within normal limits. No depression ; HAS  anxiety.  SKIN: warm dry intact         Medication List:  Current Outpatient Medications   Medication Sig   ? acetaminophen (TYLENOL) 500 MG tablet Take 1,000 mg by mouth 3 (three) times a day.    ? atorvastatin (LIPITOR) 40 MG tablet Take 40 mg by mouth at bedtime.   ? calcium, as carbonate, (TUMS) 200 mg calcium (500 mg) chewable tablet Chew 1 tablet daily.   ? caspofungin (CANCIDAS) 50 mg injection Infuse 50 mg into a venous catheter at bedtime.   ? ertapenem sodium (ERTAPENEM INJ) Inject 10 mL as directed once.   ? esomeprazole (NEXIUM) 40 MG capsule Take 40 mg by mouth daily before breakfast.   ? famotidine (PEPCID) 40 MG tablet Take 40 mg by mouth daily.   ? hydrOXYzine pamoate (VISTARIL) 25 MG capsule Take 25 mg by mouth 3 (three) times a day.    ? ibuprofen (ADVIL,MOTRIN) 400 MG tablet Take 400 mg by mouth every 6 (six) hours as needed for pain.   ? metoprolol tartrate (LOPRESSOR) 6.25 MG/5 ML Susp Take 12.5 mg by mouth 2 (two) times a day.   ? metroNIDAZOLE (FLAGYL) 500 MG tablet Take 500 mg by mouth 3 (three) times a day.   ? oxyCODONE (ROXICODONE) 5 MG immediate release tablet Take 1 tablet (5 mg total) by mouth every 4 (four) hours as  needed for pain.   ? pantoprazole (PROTONIX) 40 MG tablet Take 40 mg by mouth daily.   ? polyethylene glycol (MIRALAX) 17 gram packet Take 17 g by mouth daily.   ? senna-docusate (PERICOLACE) 8.6-50 mg tablet Take 1 tablet by mouth daily.       Labs:  Recent Results (from the past 240 hour(s))   Basic Metabolic Panel   Result Value Ref Range    Sodium 137 136 - 145 mmol/L    Potassium 3.6 3.5 - 5.0 mmol/L    Chloride 103 98 - 107 mmol/L    CO2 23 22 - 31 mmol/L    Anion Gap, Calculation 11 5 - 18 mmol/L    Glucose 115 70 - 125 mg/dL    Calcium 8.6 8.5 - 10.5 mg/dL    BUN 6 (L) 8 - 28 mg/dL    Creatinine 0.76 0.70 - 1.30 mg/dL    GFR MDRD Af Amer >60 >60 mL/min/1.73m2    GFR MDRD Non Af Amer >60 >60 mL/min/1.73m2   ALT (SGPT)   Result Value Ref Range    ALT 17 0 - 45 U/L   HM1 (CBC with Diff)   Result Value Ref Range    WBC 7.8 4.0 - 11.0 thou/uL    RBC 4.13 (L) 4.40 - 6.20 mill/uL    Hemoglobin 12.8 (L) 14.0 - 18.0 g/dL    Hematocrit 40.6 40.0 - 54.0 %    MCV 98 80 - 100 fL    MCH 31.0 27.0 - 34.0 pg    MCHC 31.5 (L) 32.0 - 36.0 g/dL    RDW 15.8 (H) 11.0 - 14.5 %    Platelets 362 140 - 440 thou/uL    MPV 9.1 8.5 - 12.5 fL    Neutrophils % 82 (H) 50 - 70 %    Lymphocytes % 7 (L) 20 - 40 %    Monocytes % 8 2 - 10 %    Eosinophils % 1 0 - 6 %    Basophils % 1 0 - 2 %    Neutrophils Absolute 6.4 2.0 - 7.7 thou/uL    Lymphocytes Absolute 0.6 (L) 0.8 - 4.4 thou/uL    Monocytes Absolute 0.6 0.0 - 0.9 thou/uL    Eosinophils Absolute 0.1 0.0 - 0.4 thou/uL    Basophils Absolute 0.0 0.0 - 0.2 thou/uL       Assessment/Plan:  -Acute peritonitis with CT showing pneumoperitoneum and abscess  -Status post CT-guided drain placement of the left upper quadrant fluid collection on 6/24/2020  -Status post sinogram on 6/26/2020 demonstrating a fistulous connection with the adjacent descending colon  - History of a jejunostomy tube takedown  -Underlying history of esophageal cancer  -Prior history of a minimally invasive esophagectomy on  8/30/2019  -Prior history of persistent leakage from the jejunostomy tube status post repair on 6/2/2020  -Dysphagia patient not eating and drinking other than boost  -Pain management patient reporting severe pain  Patient has been admitted to the TCU  Unfortunately not doing very well and not happy with his stay.  Drain was examined and there appears to be some fluid and air  Unfortunately he is not quite happy with the outcome and believes that there is a high possibility that he is not being treated adequately  He he feels that like in the past this may be ignored and he may be discharged home without being adequately treated.  Reassurance given  He has a follow-up with [place] tomorrow for repeat CT of his abdomen  I told him they will monitor it clinically.  Labs also reviewed with him including improvement in white count.  He is on antibiotics he will be getting them IV per duration determined by [facility].    He thinks he will have repeat surgical intervention once he is done with antibiotics  He is also quite upset with his pain management.  He continues to complain of bandlike pain in his abdomen.  He is requesting that we increase his pain pills he was wondering why they are scheduled he was getting oxycodone with Tylenol every 4 hours alternating with oxycodone with ibuprofen every 4 hours.  Vistaril has been added to his regimen with significant improvement.  Warning signs of using so much ibuprofen were given to him but he is adamant.  A care conference has been done with him with his surgical team in [place] and will await their recommendation he would like to see what they have to say for pain management before asking for any further changes.  I also talked to him about his dysphagia issues and he will not eat and drink anything other than boost plus he thinks that is giving him enough intake.  He told me unfortunately if he eats anything even puréed diet it just sits there and he cannot process it so he  does not want to even try.  I did mention that as long as his weight stays stable I am okay with that    Nursing did update me that he has missed a dose of his IV antibiotics caspofungin and unfortunately received a double dose of ertapenem.  We will monitor him for side effects and give him an extra dose of his caspofungin  Per infectious disease there is no end date he will follow-up with repeat imaging and then it will be determined how long he will remain on his 3 antibiotics including ertapenem metronidazole and caspofungin    Medication compliance reviewed with him he is not taking several of his meds he is absolutely refusing to take metoprolol  I did encourage him to take it but he says he has allergies to it he is not sure why [facility] insisted on giving it to him  In addition he is refusing his Tylenol  He will let staff know what he does not plan to take and we can either discontinue them or switch them    Video-Visit Details    Type of service:  Video Visit    Video End Time (time video stopped): 3PM    Originating Location (pt. Location):[facility] [[id number]]    Distant Location (provider location):  [facility]     Mode of Communication:  Zoom Video Conference        MESHA Shaw

## 2021-06-20 NOTE — LETTER
"Letter by Quin Cook MBBS at      Author: Quin Cook MBBS Service: -- Author Type: --    Filed:  Encounter Date: 7/21/2020 Status: (Other)         Patient: Elias Elam   MR Number: [id number]   YOB: 1945   Date of Visit: 7/21/2020     [facility]   Video Visit    Code Status: FULL CODE    Elias Elam is a 74 y.o. male who is being evaluated via a billable video visit.      The patient has been notified of following:     \"This video visit will be conducted via a call between you and your physician/provider. We have found that certain health care needs can be provided without the need for an in-person physical exam.  This service lets us provide the care you need with a video conversation.  If a prescription is necessary we can send it to the facility team.  If lab work is needed we can place an order through the facility team to have that test done at a later time.    If during the course of the call the physician/provider feels a video visit is not appropriate, you will not be charged for this service.\"     Physician/Provider has received verbal consent for a Video Visit from the patient? Yes    Patient would like the video invitation sent by: Send to: [employer]    Video Start Time: 12.15 am    Chief Complaint/Reason for Visit:  Chief Complaint   Patient presents with   ? Problem Visit   Follow-up on CT scan and pain management    HPI:   Elias is a 74 y.o. male who is admitted to the TCU as a transfer from the hospital.  Patient has a complex medical history including a past medical history of esophageal cancer  He initially was diagnosed last year and underwent surgical repair.  Since then he believes he is cancer free.    Unfortunately this left him with profound dysphagia and difficulty in swallowing.  He had a GJ tube removal done.  Unfortunately due to persistent dysphagia currently he has a dilator which he is using himself to open up his esophagus.    Seen " urgently at the request of nursing because of intractable pain with nausea that he has been reporting.  Patient reports that he has been eating a little better and has been not only drinking his boost but he has been having some intake of food also now.  He is reporting severe nausea post intake he is not sure if this is related to food medication or boost because he is reporting that any slight movement is causing him significant pain.  Unfortunately this is not a localized pain and he is unable to completely explain it he is not reporting any constipation so far has not had any significant bouts of emesis either.  He ended up going to the emergency room and he had urgent imaging which she wanted to review that actually did not show any obstruction and shows resolution of his abscess overall picture has been reportedly going well and he has been discharged back to the nursing home with Zofran.    Pain management reviewed with him he has not asked for a single narcotics since the 14th.  He feels his pain is very well controlled just with Tylenol as well as Vistaril  Suspect that as his infection is resolving his pain is going down quite nicely clinically also he feels better.  He is ambulating without any assist device    He had lab work done in the emergency room today and he had recent labs done in the TCU which were reviewed with him his hemoglobin is 13.7 and his white count is normal  His electrolytes are stable other than mild hyponatremia with a sodium 135 his calcium is slightly up at 10.6.  I am wondering if he is getting dehydrated and that may be contributing to his nausea      I have reviewed and updated the patient's Past Medical History, includes a history of esophageal cancer.  Chronic pain and removal of jejunostomy tube    Social History, includes history of prior smoking no current history of alcohol or smoking.     Family History includes h/o of renal failure in his brother who is on HD ; sister  has breast cancer   and Medication List.    ALLERGIES  Metoprolol and Nitroglycerin    Review of Systems  Constitutional: Negative.  Negative for fever, chills, he has activity change, appetite change and fatigue.   He is refusing medications  He is refusing any kind of oral intake and drinking only 5 cans of boost in a day  Also reporting persistent dysphagia and difficulty with swallowing   He ended up self dilating his throat  HE DOES NOT EAT SOLID FOODS AT ALL  HENT: Negative for congestion and facial swelling.    Eyes: Negative for photophobia, redness and visual disturbance.   Respiratory: Negative for cough and chest tightness.    Cardiovascular: Negative for chest pain, palpitations and leg swelling.   Gastrointestinal: Negative for nausea, diarrhea, constipation, blood in stool and abdominal distention.   Has a CASANDRA drain which is intact and draining  Genitourinary: Negative.    Musculoskeletal: Negative.  Reporting weakness in a bandlike pain around his abdomen has pretty much improved and now he is ambulating and has no pain  Skin: Negative.    Neurological: Negative for dizziness, tremors, syncope, weakness, light-headedness and headaches.   Hematological: Does not bruise/bleed easily.   Psychiatric/Behavioral: Patient has had a significant improvement in mood    Physical exam  Wt 178 lb  / 55 T98 P80  GENERAL: no acute distress. Cooperative in conversation. FRAIL  HEENT: pupils are equal, round and reactive. Oral mucosa is moist and intact.  RESP:Chest symmetric. Regular respiratory rate. No stridor.  ABD: Nondistended, soft.  HAS A CASANDRA DRAIN IN LUQ  EXTREMITIES: No lower extremity edema.   NEURO: non focal. Alert and oriented x3.   PSYCH: within normal limits. No depression ; HAS  anxiety.  SKIN: warm dry intact         Medication List:  Current Outpatient Medications   Medication Sig   ? acetaminophen (TYLENOL) 500 MG tablet Take 1,000 mg by mouth 3 (three) times a day.    ? atorvastatin (LIPITOR)  40 MG tablet Take 40 mg by mouth at bedtime.   ? calcium, as carbonate, (TUMS) 200 mg calcium (500 mg) chewable tablet Chew 1 tablet daily.   ? caspofungin (CANCIDAS) 50 mg injection Infuse 50 mg into a venous catheter at bedtime.   ? ertapenem sodium (ERTAPENEM INJ) Inject 10 mL as directed once.   ? esomeprazole (NEXIUM) 40 MG capsule Take 40 mg by mouth daily before breakfast.   ? famotidine (PEPCID) 40 MG tablet Take 40 mg by mouth daily.   ? hydrOXYzine pamoate (VISTARIL) 25 MG capsule Take 25 mg by mouth 3 (three) times a day.    ? ibuprofen (ADVIL,MOTRIN) 400 MG tablet Take 400 mg by mouth every 6 (six) hours as needed for pain.   ? metoprolol tartrate (LOPRESSOR) 6.25 MG/5 ML Susp Take 12.5 mg by mouth 2 (two) times a day.   ? metroNIDAZOLE (FLAGYL) 500 MG tablet Take 500 mg by mouth 3 (three) times a day.   ? oxyCODONE (ROXICODONE) 5 MG immediate release tablet Take 1 tablet (5 mg total) by mouth every 4 (four) hours as needed for pain.   ? pantoprazole (PROTONIX) 40 MG tablet Take 40 mg by mouth daily.   ? polyethylene glycol (MIRALAX) 17 gram packet Take 17 g by mouth daily.   ? senna-docusate (PERICOLACE) 8.6-50 mg tablet Take 1 tablet by mouth daily.       Labs:  Recent Results (from the past 240 hour(s))   ALT (SGPT)   Result Value Ref Range    ALT 11 0 - 45 U/L   Creatinine   Result Value Ref Range    Creatinine 0.70 0.70 - 1.30 mg/dL    GFR MDRD Af Amer >60 >60 mL/min/1.73m2    GFR MDRD Non Af Amer >60 >60 mL/min/1.73m2   HM1 (CBC with Diff)   Result Value Ref Range    WBC 6.4 4.0 - 11.0 thou/uL    RBC 3.98 (L) 4.40 - 6.20 mill/uL    Hemoglobin 12.3 (L) 14.0 - 18.0 g/dL    Hematocrit 40.2 40.0 - 54.0 %     (H) 80 - 100 fL    MCH 30.9 27.0 - 34.0 pg    MCHC 30.6 (L) 32.0 - 36.0 g/dL    RDW 16.1 (H) 11.0 - 14.5 %    Platelets 341 140 - 440 thou/uL    MPV 10.0 8.5 - 12.5 fL    Neutrophils % 68 50 - 70 %    Lymphocytes % 15 (L) 20 - 40 %    Monocytes % 11 (H) 2 - 10 %    Eosinophils % 5 0 - 6 %     Basophils % 1 0 - 2 %    Neutrophils Absolute 4.3 2.0 - 7.7 thou/uL    Lymphocytes Absolute 1.0 0.8 - 4.4 thou/uL    Monocytes Absolute 0.7 0.0 - 0.9 thou/uL    Eosinophils Absolute 0.3 0.0 - 0.4 thou/uL    Basophils Absolute 0.1 0.0 - 0.2 thou/uL   ALT (SGPT)   Result Value Ref Range    ALT 39 0 - 45 U/L   Basic Metabolic Panel   Result Value Ref Range    Sodium 135 (L) 136 - 145 mmol/L    Potassium 4.2 3.5 - 5.0 mmol/L    Chloride 101 98 - 107 mmol/L    CO2 24 22 - 31 mmol/L    Anion Gap, Calculation 10 5 - 18 mmol/L    Glucose 102 70 - 125 mg/dL    Calcium 10.6 (H) 8.5 - 10.5 mg/dL    BUN 13 8 - 28 mg/dL    Creatinine 0.75 0.70 - 1.30 mg/dL    GFR MDRD Af Amer >60 >60 mL/min/1.73m2    GFR MDRD Non Af Amer >60 >60 mL/min/1.73m2   HM1 (CBC with Diff)   Result Value Ref Range    WBC 7.4 4.0 - 11.0 thou/uL    RBC 4.41 4.40 - 6.20 mill/uL    Hemoglobin 13.7 (L) 14.0 - 18.0 g/dL    Hematocrit 43.4 40.0 - 54.0 %    MCV 98 80 - 100 fL    MCH 31.1 27.0 - 34.0 pg    MCHC 31.6 (L) 32.0 - 36.0 g/dL    RDW 15.7 (H) 11.0 - 14.5 %    Platelets 353 140 - 440 thou/uL    MPV 9.4 8.5 - 12.5 fL    Neutrophils % 76 (H) 50 - 70 %    Lymphocytes % 10 (L) 20 - 40 %    Monocytes % 9 2 - 10 %    Eosinophils % 3 0 - 6 %    Basophils % 1 0 - 2 %    Neutrophils Absolute 5.7 2.0 - 7.7 thou/uL    Lymphocytes Absolute 0.8 0.8 - 4.4 thou/uL    Monocytes Absolute 0.7 0.0 - 0.9 thou/uL    Eosinophils Absolute 0.2 0.0 - 0.4 thou/uL    Basophils Absolute 0.1 0.0 - 0.2 thou/uL   ct shows decreased size of fluid collection    Assessment/Plan:  Severe abdominal pain with intractable nausea patient sent to the emergency room this morning currently back  -Multiple intra-abdominal polymicrobial abscesses status post drain placement 6/24/2020 with drain in primary left upper quadrant collection.  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  -Long-term antibiotic use toxicity  monitoring  -Acute episode of dysphagia reported with patient having difficulty with swallowing of ibuprofen  -Acute peritonitis with CT showing pneumoperitoneum and abscess  -Status post CT-guided drain placement of the left upper quadrant fluid collection on 6/24/2020  -Status post sinogram on 6/26/2020 demonstrating a fistulous connection with the adjacent descending colon  - History of a jejunostomy tube takedown  -Underlying history of esophageal cancer  -Prior history of a minimally invasive esophagectomy on 8/30/2019  -Prior history of persistent leakage from the jejunostomy tube status post repair on 6/2/2020  -Dysphagia patient not eating and drinking other than boost  -Pain management patient reporting severe pain  -Moderate protein calorie malnutrition    Patient has been admitted to the TCU  He has been complaining of intractable nausea with abdominal pain.  [place] was contacted and at the request he was sent to the emergency room for imaging.  He did go this morning and currently is back in the nursing home.  An immediate CT of the abdomen and pelvis was done in the hospital but did not show any abnormality to explain patient's new symptoms  No obstruction  No inflammation noted on scanning  Interval drain placement and resolution of prior left upper quadrant abscess was noted again  No new significant findings were noted on imaging.  Recheck BMP done in the hospital shows a sodium of 137 improved from 135 yesterday and his calcium is also 9.3 which is an improvement GFR is more than 60 liver enzymes were also reviewed and his alkaline phosphatase was slightly elevated at 125 but his AST ALT were also normal.  Recheck hemoglobin was 12.7 with stable white count.  Lipase was 39.7.  No significant abnormality to explain his intractable nausea was noted and he has been discharged with Zofran.  On reviewing his concerns suspect that there might be some may be intermittent concern about dehydration or  dysphagia contributing to his nausea and he was advised not to eat anything orally and we could give him IV fluid  This would keep him hydrated and see if his symptoms did resolve.  He is currently not too keen to pursue that option.  I did tell him just to stick to boost today and not eat anything solid and see what happens he does not think is related to a pill or food item but if things do not improve we did mention that he could go n.p.o. and we could switch him to IV fluids for a few days to see if his symptoms improve.  Overall his imaging done in the emergency room actually is quite impressive for how quickly he has had improvement.  He will call [place] to make sure it is not antibiotic induced nausea.  In the meantime continue with his care plan monitor closely        Video-Visit Details    Type of service:  Video Visit    Video End Time (time video stopped): 12. 40 PM    Originating Location (pt. Location):[facility] [[id number]]    Distant Location (provider location):  [facility]     Mode of Communication:  Zoom Video Conference        MESHA Shaw

## 2021-06-20 NOTE — LETTER
"Letter by Quin Cook MBBS at      Author: Quin Cook MBBS Service: -- Author Type: --    Filed:  Encounter Date: 7/9/2020 Status: (Other)         Patient: Elias Elam   MR Number: [id number]   YOB: 1945   Date of Visit: 7/9/2020     [facility]   Video Visit    Code Status: FULL CODE    Elias Elam is a 74 y.o. male who is being evaluated via a billable video visit.      The patient has been notified of following:     \"This video visit will be conducted via a call between you and your physician/provider. We have found that certain health care needs can be provided without the need for an in-person physical exam.  This service lets us provide the care you need with a video conversation.  If a prescription is necessary we can send it to the facility team.  If lab work is needed we can place an order through the facility team to have that test done at a later time.    If during the course of the call the physician/provider feels a video visit is not appropriate, you will not be charged for this service.\"     Physician/Provider has received verbal consent for a Video Visit from the patient? Yes    Patient would like the video invitation sent by: Send to: [employer]    Video Start Time: 11.30 am    Chief Complaint/Reason for Visit:  Chief Complaint   Patient presents with   ? Review Of Multiple Medical Conditions   Follow-up on CT scan and pain management    HPI:   Elias is a 74 y.o. male who is admitted to the TCU as a transfer from the hospital.  Patient has a complex medical history including a past medical history of esophageal cancer  He initially was diagnosed last year and underwent surgical repair.  Since then he believes he is cancer free.    Unfortunately this left him with profound dysphagia and difficulty in swallowing.  He had a GJ tube placed  Unfortunately he continued to have multiple complications and in January 2020 he had a jejunostomy tube taken " down.  Post tube removal he continued to complain of abdominal pain which worsened and he presented to the emergency room where he was noted on CT to have a pneumoperitoneum and an abscess.  He requested a transfer to [facility].  He did have a follow-up done with repeat imaging done in [facility] he continues to believe that his abscesses have not improved in his opinion however he was told that he had improvement in imaging which he discounts      He has moderate malnutrition he is refusing to eat and drink normally he is subsisting on 5 cans of boost plus day he has calculated his calories thinks he is getting about 1800 and is quite happy he believes he is getting weight  Unfortunately he is not able to eat or drink anything  Yesterday he did get an ibuprofen and that got stuck  He came back to the nursing home he has self dilatation kit available and he self dilated after numbing his mouth he believes things got a little better  I did ask him if he would like his medication crushed to prevent this outcome he does not want to do that either      Pain management remains another challenge at home he was taking high doses of narcotics to manage his pain recently Vistaril was added to his regimen he has been using high doses of  oxycodone in addition he was also using ibuprofeN   He is feeling somewhat better regarding his pain pills unfortunately after the difficulty he had with ibuprofen I am hesitant to schedule that medication for him      I have reviewed and updated the patient's Past Medical History, includes a history of esophageal cancer.  Chronic pain and removal of jejunostomy tube    Social History, includes history of prior smoking no current history of alcohol or smoking.     Family History includes h/o of renal failure in his brother who is on HD ; sister has breast cancer   and Medication List.    ALLERGIES  Metoprolol and Nitroglycerin    Review of Systems  Constitutional: Negative.  Negative for  fever, chills, he has activity change, appetite change and fatigue.   He is refusing medications  He is refusing any kind of oral intake and drinking only 5 cans of boost in a day  Also reporting persistent dysphagia and difficulty with swallowing ibuprofen last night  He ended up self dilating his throat  HENT: Negative for congestion and facial swelling.    Eyes: Negative for photophobia, redness and visual disturbance.   Respiratory: Negative for cough and chest tightness.    Cardiovascular: Negative for chest pain, palpitations and leg swelling.   Gastrointestinal: Negative for nausea, diarrhea, constipation, blood in stool and abdominal distention.   Has a CASANDRA drain which is intact and draining  Genitourinary: Negative.    Musculoskeletal: Negative.  Reporting weakness in a bandlike pain around his abdomen  Skin: Negative.    Neurological: Negative for dizziness, tremors, syncope, weakness, light-headedness and headaches.   Hematological: Does not bruise/bleed easily.   Psychiatric/Behavioral: Patient has significant anxiety    Physical exam  Wt 178 lb  / 55 T98 P80  GENERAL: no acute distress. Cooperative in conversation. FRAIL  HEENT: pupils are equal, round and reactive. Oral mucosa is moist and intact.  RESP:Chest symmetric. Regular respiratory rate. No stridor.  ABD: Nondistended, soft.  HAS A CASANDRA DRAIN IN LUQ  EXTREMITIES: No lower extremity edema.   NEURO: non focal. Alert and oriented x3.   PSYCH: within normal limits. No depression ; HAS  anxiety.  SKIN: warm dry intact         Medication List:  Current Outpatient Medications   Medication Sig   ? acetaminophen (TYLENOL) 500 MG tablet Take 1,000 mg by mouth 3 (three) times a day.    ? atorvastatin (LIPITOR) 40 MG tablet Take 40 mg by mouth at bedtime.   ? calcium, as carbonate, (TUMS) 200 mg calcium (500 mg) chewable tablet Chew 1 tablet daily.   ? caspofungin (CANCIDAS) 50 mg injection Infuse 50 mg into a venous catheter at bedtime.   ? ertapenem  sodium (ERTAPENEM INJ) Inject 10 mL as directed once.   ? esomeprazole (NEXIUM) 40 MG capsule Take 40 mg by mouth daily before breakfast.   ? famotidine (PEPCID) 40 MG tablet Take 40 mg by mouth daily.   ? hydrOXYzine pamoate (VISTARIL) 25 MG capsule Take 25 mg by mouth 3 (three) times a day.    ? ibuprofen (ADVIL,MOTRIN) 400 MG tablet Take 400 mg by mouth every 6 (six) hours as needed for pain.   ? metoprolol tartrate (LOPRESSOR) 6.25 MG/5 ML Susp Take 12.5 mg by mouth 2 (two) times a day.   ? metroNIDAZOLE (FLAGYL) 500 MG tablet Take 500 mg by mouth 3 (three) times a day.   ? oxyCODONE (ROXICODONE) 5 MG immediate release tablet Take 1 tablet (5 mg total) by mouth every 4 (four) hours as needed for pain.   ? pantoprazole (PROTONIX) 40 MG tablet Take 40 mg by mouth daily.   ? polyethylene glycol (MIRALAX) 17 gram packet Take 17 g by mouth daily.   ? senna-docusate (PERICOLACE) 8.6-50 mg tablet Take 1 tablet by mouth daily.       Labs:  Recent Results (from the past 240 hour(s))   Basic Metabolic Panel   Result Value Ref Range    Sodium 137 136 - 145 mmol/L    Potassium 3.6 3.5 - 5.0 mmol/L    Chloride 103 98 - 107 mmol/L    CO2 23 22 - 31 mmol/L    Anion Gap, Calculation 11 5 - 18 mmol/L    Glucose 115 70 - 125 mg/dL    Calcium 8.6 8.5 - 10.5 mg/dL    BUN 6 (L) 8 - 28 mg/dL    Creatinine 0.76 0.70 - 1.30 mg/dL    GFR MDRD Af Amer >60 >60 mL/min/1.73m2    GFR MDRD Non Af Amer >60 >60 mL/min/1.73m2   ALT (SGPT)   Result Value Ref Range    ALT 17 0 - 45 U/L   HM1 (CBC with Diff)   Result Value Ref Range    WBC 7.8 4.0 - 11.0 thou/uL    RBC 4.13 (L) 4.40 - 6.20 mill/uL    Hemoglobin 12.8 (L) 14.0 - 18.0 g/dL    Hematocrit 40.6 40.0 - 54.0 %    MCV 98 80 - 100 fL    MCH 31.0 27.0 - 34.0 pg    MCHC 31.5 (L) 32.0 - 36.0 g/dL    RDW 15.8 (H) 11.0 - 14.5 %    Platelets 362 140 - 440 thou/uL    MPV 9.1 8.5 - 12.5 fL    Neutrophils % 82 (H) 50 - 70 %    Lymphocytes % 7 (L) 20 - 40 %    Monocytes % 8 2 - 10 %    Eosinophils % 1  0 - 6 %    Basophils % 1 0 - 2 %    Neutrophils Absolute 6.4 2.0 - 7.7 thou/uL    Lymphocytes Absolute 0.6 (L) 0.8 - 4.4 thou/uL    Monocytes Absolute 0.6 0.0 - 0.9 thou/uL    Eosinophils Absolute 0.1 0.0 - 0.4 thou/uL    Basophils Absolute 0.0 0.0 - 0.2 thou/uL   ct shows decreased size of fluid collection    Assessment/Plan:  -Multiple intra-abdominal polymicrobial abscesses status post drain placement 6/24/2020 with drain in primary left upper quadrant collection.  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  -Long-term antibiotic use toxicity monitoring  -Acute episode of dysphagia reported with patient having difficulty with swallowing of ibuprofen  -Acute peritonitis with CT showing pneumoperitoneum and abscess  -Status post CT-guided drain placement of the left upper quadrant fluid collection on 6/24/2020  -Status post sinogram on 6/26/2020 demonstrating a fistulous connection with the adjacent descending colon  - History of a jejunostomy tube takedown  -Underlying history of esophageal cancer  -Prior history of a minimally invasive esophagectomy on 8/30/2019  -Prior history of persistent leakage from the jejunostomy tube status post repair on 6/2/2020  -Dysphagia patient not eating and drinking other than boost  -Pain management patient reporting severe pain  -Moderate protein calorie malnutrition    Patient has been admitted to the TCU  He did have a follow-up done with his infectious disease specialist in [facility] yesterday.  He continues to complain that he thinks that his abscesses are no longer any better.  Copy of the imaging done on 7/8/2020 were reviewed with him his CT abdomen and pelvis shows a more first left upper quadrant gas and fluid collection related to the colonic fistula has decreased in size with a percutaneous drain in place.  There is also ongoing left upper quadrant inflammation and ongoing  fistula.  There is even evidence of impaired biliary drainage small locules of fluid related to prior jejunal feeding tube site without new drainable collection.  On 7/8/2020 also had a sinogram which showed a decrease size in the left upper quadrant fluid collection with persistent colonic fistula.  No drain manipulation.  They recommended continued flushing with fluids twice daily.  He will also have repeat imaging and sinogram done again in 2 weeks.  In addition based on his cultures and sensitivities infectious disease has recommended continuation of ertapenem until his next visit in 2 weeks.  Flagyl has been discontinued.  Caspofungin has been discontinued.  He will be on voriconazole 200 mg twice daily.  They have recommended that he have a CT abdomen pelvis and sinogram in 2 weeks and an EKG also for QT monitoring.  He will continue with his weekly labs.  Based on reviewing their imaging and chart it seems that his fluid collection has decreased the patient remains adamant that that is not the case.  He was advised to follow-up with his surgeon for further queries.  In addition change in antibiotics were reviewed with him  In spite of his severe pain issues no change in pain management has been made.  I did suggest to him that maybe we could start crushing his pills he had a profound episode of dysphagia when his pill got stuck in his throat  And he could not bring it up nor could he swallow it.  He is adamant that he will not have the change made  He did do a self dilatation using his own dilators and he believes that situation has resolved  Unfortunately he is resistant to any changes in his treatment plan so we will leave the situation as it is  Monitor his clinical symptoms continue with his PT OT and rehab  He can discuss this further with [facility]  He is upset that he cannot go home  I did explain to him that he requires close monitoring and his medical plan is quite complex currently  Also he is on  IV antibiotics and it would be beneficial for him to stay in the TCU    Video-Visit Details    Type of service:  Video Visit    Video End Time (time video stopped): 12  .05 pM    Originating Location (pt. Location):[facility] [[id number]]    Distant Location (provider location):  [facility]     Mode of Communication:  Zoom Video Conference        MESHA Shaw

## 2021-06-20 NOTE — LETTER
"Letter by Odalis Parikh CNP at      Author: Odalis Parikh CNP Service: -- Author Type: --    Filed:  Encounter Date: 7/3/2020 Status: (Other)         Patient: Elias Elam   MR Number: [id number]   YOB: 1945   Date of Visit: 7/3/2020     [facility]   Video Visit    Code Status: Full    Elias Elam is a 74 y.o. male who is being evaluated via a billable video visit.      The patient has been notified of following:     \"This video visit will be conducted via a call between you and your physician/provider. We have found that certain health care needs can be provided without the need for an in-person physical exam.  This service lets us provide the care you need with a video conversation.  If a prescription is necessary we can send it to the facility team.  If lab work is needed we can place an order through the facility team to have that test done at a later time.    If during the course of the call the physician/provider feels a video visit is not appropriate, you will not be charged for this service.\"     Physician/provider has received verbal consent for a Video Visit from the patient? Yes      Video Start Time: 1001  Chief Complaint/Reason for Visit:  Chief Complaint   Patient presents with   ? Review Of Multiple Medical Conditions     initiate care/pain managment       HPI:   Elias is a 74 y.o. male who is an admission from [facility] to the TCU.  I think initiation of care with medical care for seniors.  He originally presented with abdominal pain to [place] ED but was transferred to the [facility].  He returned to the weakness and fatigue and having extreme abdominal pain.  A CAT scan was completed and it was found that he had a normal.  Total knee M and complex fluid collection his abdominal exam included insertion of a CASANDRA bulb on her CAT guided the drain was placed and he was also put on Vanco and Zosyn.  A final diagnosis is abdominal abscess.  He " "does have a drain in place with red rubra drainage.  Neck shift reported there is 90 cc out.  Nursing will call surgeon today just to update on the color and amount of drainage that continues.. He is a bit angry this a.m. as he would like oxycodone 10 and only has 5 ordered.  Was having a lot of pain and I gave a one-time order for an extra 5 so he had a total of 10 mg this a.m. however I will continue him on 5 mg of Aloxi every 4 as needed suggest nursing manager talk to him about his concerns with not receiving pain meds in a timely manner per his perspective.  We also discussed adjusting pain medications from PRN to scheduled such as scheduling Tylenol, which she says is not effective at all.  I did talk to him about having Tylenol Vistaril combination which he felt was worth trying.  We also did discuss about a possible muscle relaxant if pain still continues.  As reported he is angry about his pain management and feels he could go home and take \"what ever he wanted to take.  I did explain to him the risks and benefits of self-medicating and using medication that is not appropriate such as a lot of ibuprofen can cause a GI bleed.  At this juncture he is angry and did not care to listen to me.      I have reviewed and updated the patient's Past Medical History, Social History, Family History and Medication List.    ALLERGIES  Metoprolol and Nitroglycerin    Review of Systems  Very focused on pain to lower left abdomen, needed much redirection to discuss ROS but :  Currently, no fever, chills, or rigors. Does not have any visual or hearing problems. Denies any chest pain, headaches, palpitations, lightheadedness, dizziness, shortness of breath, or cough. Appetite is good. Denies any GERD symptoms. Does any difficulty with swallowing, Denies any abdominal pain, constipation or loose stools. No insomnia. No active bleeding      Vitals:    07/04/20 1056   BP: 108/75   Pulse: (!) 58   Temp: 98.4  F (36.9  C)   Weight: " "178 lb (80.7 kg)         Assessment/Plan:    ICD-10-CM    1. Lower abdominal pain  R10.30    2. Esophageal obstruction  K22.2    3. ACP (advance care planning)  Z71.89       1.  Lower abdominal pain; okay to give an extra dose of Oxy 5 milligrams a.m. x1 dose only.  We will adjust the Tylenol to scheduled and add Vistaril 25 mg p.o.  3 times daily.. We hope that the pain will be under control so patient can participate in PT and OT and skilled nursing will continue to manage chronic medical conditions.  Done right now \"yeah    2.  Esophageal obstruction; patient has a self dilatation kit he will have somebody bring from home as he feels that he is starting to close a little bit and having more trouble swallowing.    3 advanced care planning discussed advanced care planning with patient and his desires to be full code this is been noted in chart.  Video-Visit Details    Type of service:  Video Visit    Video End Time (time video stopped): 1028    Originating Location (pt. Location):[facility] [[id number]]    Distant Location (provider location):  [facility]     Mode of Communication:  Zoom Video Conference  Greater than 45 minutes spent with pt as we discussed her POC including medication r/t pain management , TCU routines and ACP which is Full Code.      Odalis Parikh, CNP         "

## 2021-06-20 NOTE — LETTER
"Letter by Odalis Parikh CNP at      Author: Odalis Parikh CNP Service: -- Author Type: --    Filed:  Encounter Date: 7/22/2020 Status: (Other)         Patient: Elias Elam   MR Number: [id number]   YOB: 1945   Date of Visit: 7/22/2020     [facility]   Video Visit    Code Status: Full    Elias Elam is a 74 y.o. male who is being evaluated via a billable video visit.      The patient has been notified of following:     \"This video visit will be conducted via a call between you and your physician/provider. We have found that certain health care needs can be provided without the need for an in-person physical exam.  This service lets us provide the care you need with a video conversation.  If a prescription is necessary we can send it to the facility team.  If lab work is needed we can place an order through the facility team to have that test done at a later time.    If during the course of the call the physician/provider feels a video visit is not appropriate, you will not be charged for this service.\"     Physician/provider has received verbal consent for a Video Visit from the patient? Yes      Video Start Time: 1003  Chief Complaint/Reason for Visit:  Chief Complaint   Patient presents with   ? Review Of Multiple Medical Conditions     nv       HPI:   Elias is a 74 y.o. male who is an admission from [facility] to the TCU. Seen for review of multiple medical conditions.   He originally presented with abdominal pain to [place] ED but was transferred to the [facility].  He returned to the weakness and fatigue and having extreme abdominal pain.  A CAT scan was completed and it was found that he had a normal.  Total knee M and complex fluid collection his abdominal exam included insertion of a CASANDRA bulb on her CAT guided the drain was placed and he was also put on Vanco and Zosyn.  A final diagnosis is abdominal abscess.  He does have a drain in place with red " rubra drainage.    Today he reports abd pain a bit better, to [facility] tomorrow. He was offerred IVF and reports he is taking 5 or so ensures a day just not eating solids and declines. He does self dialate his esoughagus and does not feel that is his issue. Declines intervention.  I have reviewed and updated the patient's Past Medical History, Social History, Family History and Medication List.    ALLERGIES  Metoprolol and Nitroglycerin    Review of Systems    Currently, no fever, chills, or rigors. Does not have any visual or hearing problems. Denies any chest pain, headaches, palpitations, lightheadedness, dizziness, shortness of breath, or cough. Appetite is poor. Denies any GERD symptoms. Does any difficulty with swallowing, Denies any abdominal pain, constipation or loose stools. No insomnia. No active bleeding      Vitals:    07/23/20 0914   BP: 96/51   Pulse: (!) 48   Temp: 97.1  F (36.2  C)   Weight: 161 lb (73 kg)         Assessment/Plan:    ICD-10-CM    1. Lower abdominal pain  R10.30    2. Decreased oral intake  R63.8       1.  Lower abdominal pain; somewhat stable, reports today better than it has been no vomitting but some nausea. Reporting oral intake stable    2.  Oral intake; Has reported no further vomitting, nausea remains. Offerred IVF declined feels intake with ensure adequete. To see [place] on 7/23/20      Video-Visit Details    Type of service:  Video Visit    Video End Time (time video stopped): 1010    Originating Location (pt. Location):[facility] [[id number]]    Distant Location (provider location):  [facility]         Odalis Parikh, BARRIE

## 2021-06-20 NOTE — LETTER
Letter by Quin Cook MBBS at      Author: Quin Cook MBBS Service: -- Author Type: --    Filed:  Encounter Date: 7/14/2020 Status: (Other)         Patient: Elias Elam   MR Number: [id number]   YOB: 1945   Date of Visit: 7/14/2020       [facility] Admission note      Patient: Elias Elam  MRN: [id number]  Date of Service: 7/14/2020      [facility] [[id number]]  Reason for Visit     Chief Complaint   Patient presents with   ? Review Of Multiple Medical Conditions       Code Status     FULL CODE    Assessment     - multiple intra abdominal polymicrobial abscess  S/p drain placement 6/24 /20  - dysphagia pt doing self dilatation  -That is post CT-guided drain placement in the left upper quadrant fluid collection on 6/24/2020  -History of esophageal cancer  -Pain management patient reporting significant improvement    Plan     Patient was evaluated and his repeat CT scan imaging was reviewed with him that he had in the [facility].  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  He is feeling better.  He is no longer febrile or having any significant pain to the point that he has cut down his narcotic intake significantly.  He will follow-up with [facility] in the meantime due to dysphagia he is doing his self dilatation.  He has no desire to eat and will stick to his boost supplementation.  Weights are stable at 166 pounds.  Pain has improved significantly and now he is reporting that he is using narcotics occasionally as as needed compared to earlier when he was having severe pain  He is now hopeful that he may discharge home he understands his antibiotics may not be covered and will talk to his  insurance    History     Patient is a very pleasant 74 y.o. male who is admitted to TCU  He has an underlying history of esophageal cancer and currently he is believed that he is cured of the disease.  He does have significant dysphagia secondary to his prior surgeries and has been self dilating his throat with the dilator he did show to me and he is quite comfortable doing it on his own.  Due to dysphagia he remains on a soft diet he generally takes boost  And is on liquids but does not eat anything  Weights are currently stable at 166 pounds.  He currently has a CASANDRA drain present in his left upper quadrant which has about 10 to 15 mL of fluid.  Pain management was reviewed with him earlier he was complaining of severe pain now he is reporting that his pain is down he is taking a narcotic only occasionally.  Physically he is ambulating without any assist device and feels he may be ready to go home soon        Past Medical History     Active Ambulatory (Non-Hospital) Problems    Diagnosis   ? ACP (advance care planning)   ? Encounter for attention to other artificial openings of digestive tract (H)   ? Esophageal obstruction   ? Abdominal pain     Past Medical History:   Diagnosis Date   ? Anemia    ? Atherosclerosis of coronary artery of native heart without angina pectoris    ? Cataract    ? Coronary artery disease    ? Depressive disorder    ? Dysphagia    ? Gastroesophageal reflux disease    ? Malignant neoplasm (H)    ? Malignant neoplasm of esophagus (H)    ? Malignant neoplasm of upper third esophagus (H)    ? Methicillin resistant Staphylococcus aureus infection    ? Urinary retention        Past Social History     Reviewed, and he  reports that he has quit smoking. His smoking use included cigarettes. He smoked 1.50 packs per day. He has never used smokeless tobacco. He reports previous alcohol use of about 2.0 standard drinks of alcohol per week. He reports that he does not use drugs.    Family History      Reviewed, and family history includes Arthritis in his mother; Breast cancer in his sister; Coronary artery disease in his sister; Dementia in his paternal grandfather; Obesity in his son; Sleep apnea in his son.    Medication List   Post Discharge Medication Reconciliation Status: discharge medications reconciled, continue medications without change   Current Outpatient Medications on File Prior to Visit   Medication Sig Dispense Refill   ? acetaminophen (TYLENOL) 500 MG tablet Take 1,000 mg by mouth 3 (three) times a day.      ? atorvastatin (LIPITOR) 40 MG tablet Take 40 mg by mouth at bedtime.     ? calcium, as carbonate, (TUMS) 200 mg calcium (500 mg) chewable tablet Chew 1 tablet daily.     ? caspofungin (CANCIDAS) 50 mg injection Infuse 50 mg into a venous catheter at bedtime.     ? ertapenem sodium (ERTAPENEM INJ) Inject 10 mL as directed once.     ? esomeprazole (NEXIUM) 40 MG capsule Take 40 mg by mouth daily before breakfast.     ? famotidine (PEPCID) 40 MG tablet Take 40 mg by mouth daily.     ? hydrOXYzine pamoate (VISTARIL) 25 MG capsule Take 25 mg by mouth 3 (three) times a day.      ? ibuprofen (ADVIL,MOTRIN) 400 MG tablet Take 400 mg by mouth every 6 (six) hours as needed for pain.     ? metoprolol tartrate (LOPRESSOR) 6.25 MG/5 ML Susp Take 12.5 mg by mouth 2 (two) times a day.     ? metroNIDAZOLE (FLAGYL) 500 MG tablet Take 500 mg by mouth 3 (three) times a day.     ? oxyCODONE (ROXICODONE) 5 MG immediate release tablet Take 1 tablet (5 mg total) by mouth every 4 (four) hours as needed for pain. 60 tablet 0   ? pantoprazole (PROTONIX) 40 MG tablet Take 40 mg by mouth daily.     ? polyethylene glycol (MIRALAX) 17 gram packet Take 17 g by mouth daily.     ? senna-docusate (PERICOLACE) 8.6-50 mg tablet Take 1 tablet by mouth daily.       No current facility-administered medications on file prior to visit.        Allergies     Allergies   Allergen Reactions   ? Metoprolol    ? Nitroglycerin         Review of Systems   A comprehensive review of 14 systems was done. Pertinent findings noted here and in history of present illness. All the rest negative.  Constitutional: Negative.  Negative for fever, chills, HAS activity change, appetite change and fatigue.   HENT: Negative for congestion and facial swelling.    Eyes: Negative for photophobia, redness and visual disturbance.   Respiratory: Negative for cough and chest tightness.    Cardiovascular: Negative for chest pain, palpitations and leg swelling.   Gastrointestinal: Negative for nausea, diarrhea, constipation, blood in stool and abdominal distention.   REPORTING LESS ABD PAIN  Genitourinary: Negative.    Musculoskeletal: Negative.  WALKING WELL  Skin: Negative.    Neurological: Negative for dizziness, tremors, syncope, weakness, light-headedness and headaches.   Hematological: Does not bruise/bleed easily.   Psychiatric/Behavioral: Negative.        Physical Exam     Recent Vitals 7/4/2020   Weight 178 lbs   /75   Pulse 58   Temp 98.4       Constitutional: Oriented to person, place, and time and appears well-developed.   HEENT:  Normocephalic and atraumatic.  Eyes: Conjunctivae and EOM are normal. Pupils are equal, round, and reactive to light. No discharge.  No scleral icterus. Nose normal. Mouth/Throat: Oropharynx is clear and moist. No oropharyngeal exudate.    NECK: Normal range of motion. Neck supple. No JVD present. No tracheal deviation present. No thyromegaly present.   CARDIOVASCULAR: Normal rate, regular rhythm and intact distal pulses.  Exam reveals no gallop and no friction rub.  Systolic murmur present.  PULMONARY: Effort normal and breath sounds normal. No respiratory distress.No Wheezing or rales.  ABDOMEN: Soft. Bowel sounds are normal. No distension and no mass.  There is no tenderness. There is no rebound and no guarding. No HSM.  CASANDRA DRAIN PATENT IN LUQ  MUSCULOSKELETAL: Normal range of motion. No edema and no tenderness. Mild  kyphosis, no tenderness.  LYMPH NODES: Has no cervical, supraclavicular, axillary and groin adenopathy.   NEUROLOGICAL: Alert and oriented to person, place, and time. No cranial nerve deficit.  Normal muscle tone. Coordination normal.   GENITOURINARY: Deferred exam.  SKIN: Skin is warm and dry. No rash noted. No erythema. No pallor.   EXTREMITIES: No cyanosis, no clubbing, no edema. No Deformity.  PSYCHIATRIC: Normal mood, affect and behavior.      Lab Results     Recent Results (from the past 240 hour(s))   Basic Metabolic Panel    Collection Time: 07/06/20  9:34 AM   Result Value Ref Range    Sodium 137 136 - 145 mmol/L    Potassium 3.6 3.5 - 5.0 mmol/L    Chloride 103 98 - 107 mmol/L    CO2 23 22 - 31 mmol/L    Anion Gap, Calculation 11 5 - 18 mmol/L    Glucose 115 70 - 125 mg/dL    Calcium 8.6 8.5 - 10.5 mg/dL    BUN 6 (L) 8 - 28 mg/dL    Creatinine 0.76 0.70 - 1.30 mg/dL    GFR MDRD Af Amer >60 >60 mL/min/1.73m2    GFR MDRD Non Af Amer >60 >60 mL/min/1.73m2   ALT (SGPT)    Collection Time: 07/06/20  9:34 AM   Result Value Ref Range    ALT 17 0 - 45 U/L   HM1 (CBC with Diff)    Collection Time: 07/06/20  9:34 AM   Result Value Ref Range    WBC 7.8 4.0 - 11.0 thou/uL    RBC 4.13 (L) 4.40 - 6.20 mill/uL    Hemoglobin 12.8 (L) 14.0 - 18.0 g/dL    Hematocrit 40.6 40.0 - 54.0 %    MCV 98 80 - 100 fL    MCH 31.0 27.0 - 34.0 pg    MCHC 31.5 (L) 32.0 - 36.0 g/dL    RDW 15.8 (H) 11.0 - 14.5 %    Platelets 362 140 - 440 thou/uL    MPV 9.1 8.5 - 12.5 fL    Neutrophils % 82 (H) 50 - 70 %    Lymphocytes % 7 (L) 20 - 40 %    Monocytes % 8 2 - 10 %    Eosinophils % 1 0 - 6 %    Basophils % 1 0 - 2 %    Neutrophils Absolute 6.4 2.0 - 7.7 thou/uL    Lymphocytes Absolute 0.6 (L) 0.8 - 4.4 thou/uL    Monocytes Absolute 0.6 0.0 - 0.9 thou/uL    Eosinophils Absolute 0.1 0.0 - 0.4 thou/uL    Basophils Absolute 0.0 0.0 - 0.2 thou/uL   ALT (SGPT)    Collection Time: 07/13/20  7:52 AM   Result Value Ref Range    ALT 11 0 - 45 U/L    Creatinine    Collection Time: 07/13/20  7:52 AM   Result Value Ref Range    Creatinine 0.70 0.70 - 1.30 mg/dL    GFR MDRD Af Amer >60 >60 mL/min/1.73m2    GFR MDRD Non Af Amer >60 >60 mL/min/1.73m2   HM1 (CBC with Diff)    Collection Time: 07/13/20  7:52 AM   Result Value Ref Range    WBC 6.4 4.0 - 11.0 thou/uL    RBC 3.98 (L) 4.40 - 6.20 mill/uL    Hemoglobin 12.3 (L) 14.0 - 18.0 g/dL    Hematocrit 40.2 40.0 - 54.0 %     (H) 80 - 100 fL    MCH 30.9 27.0 - 34.0 pg    MCHC 30.6 (L) 32.0 - 36.0 g/dL    RDW 16.1 (H) 11.0 - 14.5 %    Platelets 341 140 - 440 thou/uL    MPV 10.0 8.5 - 12.5 fL    Neutrophils % 68 50 - 70 %    Lymphocytes % 15 (L) 20 - 40 %    Monocytes % 11 (H) 2 - 10 %    Eosinophils % 5 0 - 6 %    Basophils % 1 0 - 2 %    Neutrophils Absolute 4.3 2.0 - 7.7 thou/uL    Lymphocytes Absolute 1.0 0.8 - 4.4 thou/uL    Monocytes Absolute 0.7 0.0 - 0.9 thou/uL    Eosinophils Absolute 0.3 0.0 - 0.4 thou/uL    Basophils Absolute 0.1 0.0 - 0.2 thou/uL            Imaging Results     No results found.          MESHA Shaw

## 2021-08-08 ENCOUNTER — HEALTH MAINTENANCE LETTER (OUTPATIENT)
Age: 76
End: 2021-08-08

## 2021-10-03 ENCOUNTER — HEALTH MAINTENANCE LETTER (OUTPATIENT)
Age: 76
End: 2021-10-03

## 2022-09-04 ENCOUNTER — HEALTH MAINTENANCE LETTER (OUTPATIENT)
Age: 77
End: 2022-09-04

## 2023-10-01 ENCOUNTER — HEALTH MAINTENANCE LETTER (OUTPATIENT)
Age: 78
End: 2023-10-01